# Patient Record
Sex: MALE | Race: WHITE | NOT HISPANIC OR LATINO | Employment: FULL TIME | URBAN - METROPOLITAN AREA
[De-identification: names, ages, dates, MRNs, and addresses within clinical notes are randomized per-mention and may not be internally consistent; named-entity substitution may affect disease eponyms.]

---

## 2017-01-31 ENCOUNTER — ALLSCRIPTS OFFICE VISIT (OUTPATIENT)
Dept: OTHER | Facility: OTHER | Age: 47
End: 2017-01-31

## 2017-02-27 ENCOUNTER — GENERIC CONVERSION - ENCOUNTER (OUTPATIENT)
Dept: OTHER | Facility: OTHER | Age: 47
End: 2017-02-27

## 2017-09-05 ENCOUNTER — GENERIC CONVERSION - ENCOUNTER (OUTPATIENT)
Dept: OTHER | Facility: OTHER | Age: 47
End: 2017-09-05

## 2018-01-13 VITALS
SYSTOLIC BLOOD PRESSURE: 130 MMHG | HEIGHT: 74 IN | BODY MASS INDEX: 25.81 KG/M2 | WEIGHT: 201.13 LBS | TEMPERATURE: 99 F | DIASTOLIC BLOOD PRESSURE: 90 MMHG

## 2018-07-24 ENCOUNTER — PROCEDURE VISIT (OUTPATIENT)
Dept: FAMILY MEDICINE CLINIC | Facility: CLINIC | Age: 48
End: 2018-07-24
Payer: COMMERCIAL

## 2018-07-24 DIAGNOSIS — B07.8 COMMON WART: Primary | ICD-10-CM

## 2018-07-24 PROCEDURE — 17110 DESTRUCTION B9 LES UP TO 14: CPT | Performed by: NURSE PRACTITIONER

## 2018-07-24 NOTE — PROGRESS NOTES
Lesion Destruction  Date/Time: 7/24/2018 9:52 AM  Performed by: Debbie Meza  Authorized by: Debbie Meza     Procedure Details - Lesion Destruction:     Number of Lesions:  1  Lesion 1:     Body area:  Lower extremity    Lower extremity location:  L upper leg    Initial size (mm):  0 6    Malignancy: benign lesion      Destruction method: cryotherapy    Lesion 6:      Wart was frozen for optimal lesion destructions  Patient instructed if lesion did not fall off in a week could start using topical compound W      patient states he has had problems in the past when he was little with warts and have always done fine  This encounter was previously booked as POVS  Appointment changed to PROCEDURE visit

## 2018-10-18 PROBLEM — Z83.71 FAMILY HX COLONIC POLYPS: Status: ACTIVE | Noted: 2018-10-18

## 2018-10-18 PROBLEM — Z83.719 FAMILY HX COLONIC POLYPS: Status: ACTIVE | Noted: 2018-10-18

## 2019-01-10 ENCOUNTER — ANESTHESIA EVENT (OUTPATIENT)
Dept: GASTROENTEROLOGY | Facility: HOSPITAL | Age: 49
End: 2019-01-10
Payer: COMMERCIAL

## 2019-01-11 ENCOUNTER — ANESTHESIA (OUTPATIENT)
Dept: GASTROENTEROLOGY | Facility: HOSPITAL | Age: 49
End: 2019-01-11
Payer: COMMERCIAL

## 2019-01-11 ENCOUNTER — HOSPITAL ENCOUNTER (OUTPATIENT)
Facility: HOSPITAL | Age: 49
Setting detail: OUTPATIENT SURGERY
Discharge: HOME | End: 2019-01-11
Attending: COLON & RECTAL SURGERY | Admitting: COLON & RECTAL SURGERY
Payer: COMMERCIAL

## 2019-01-11 VITALS
WEIGHT: 195 LBS | DIASTOLIC BLOOD PRESSURE: 69 MMHG | HEIGHT: 74 IN | HEART RATE: 64 BPM | SYSTOLIC BLOOD PRESSURE: 103 MMHG | OXYGEN SATURATION: 99 % | TEMPERATURE: 97.3 F | BODY MASS INDEX: 25.03 KG/M2 | RESPIRATION RATE: 18 BRPM

## 2019-01-11 DIAGNOSIS — Z83.71 FAMILY HX COLONIC POLYPS: ICD-10-CM

## 2019-01-11 PROCEDURE — 45380 COLONOSCOPY AND BIOPSY: CPT | Performed by: COLON & RECTAL SURGERY

## 2019-01-11 PROCEDURE — 45385 COLONOSCOPY W/LESION REMOVAL: CPT | Performed by: COLON & RECTAL SURGERY

## 2019-01-11 PROCEDURE — 99213 OFFICE O/P EST LOW 20 MIN: CPT | Performed by: COLON & RECTAL SURGERY

## 2019-01-11 PROCEDURE — 88305 TISSUE EXAM BY PATHOLOGIST: CPT | Performed by: PATHOLOGY

## 2019-01-11 RX ORDER — GLYCOPYRROLATE 0.2 MG/ML
INJECTION INTRAMUSCULAR; INTRAVENOUS AS NEEDED
Status: DISCONTINUED | OUTPATIENT
Start: 2019-01-11 | End: 2019-01-11 | Stop reason: SURG

## 2019-01-11 RX ORDER — PROPOFOL 10 MG/ML
INJECTION, EMULSION INTRAVENOUS AS NEEDED
Status: DISCONTINUED | OUTPATIENT
Start: 2019-01-11 | End: 2019-01-11 | Stop reason: SURG

## 2019-01-11 RX ORDER — SODIUM CHLORIDE 9 MG/ML
INJECTION, SOLUTION INTRAVENOUS CONTINUOUS PRN
Status: DISCONTINUED | OUTPATIENT
Start: 2019-01-11 | End: 2019-01-11 | Stop reason: SURG

## 2019-01-11 RX ADMIN — GLYCOPYRROLATE 0.1 MG: 0.2 INJECTION, SOLUTION INTRAMUSCULAR; INTRAVENOUS at 08:45

## 2019-01-11 RX ADMIN — PROPOFOL 200 MG: 10 INJECTION, EMULSION INTRAVENOUS at 08:45

## 2019-01-11 RX ADMIN — PROPOFOL 30 MG: 10 INJECTION, EMULSION INTRAVENOUS at 08:53

## 2019-01-11 RX ADMIN — PROPOFOL 40 MG: 10 INJECTION, EMULSION INTRAVENOUS at 09:02

## 2019-01-11 RX ADMIN — SODIUM CHLORIDE: 0.9 INJECTION, SOLUTION INTRAVENOUS at 08:40

## 2019-01-11 RX ADMIN — PROPOFOL 20 MG: 10 INJECTION, EMULSION INTRAVENOUS at 08:56

## 2019-01-11 RX ADMIN — PROPOFOL 30 MG: 10 INJECTION, EMULSION INTRAVENOUS at 08:50

## 2019-01-11 RX ADMIN — PROPOFOL 30 MG: 10 INJECTION, EMULSION INTRAVENOUS at 08:48

## 2019-01-11 RX ADMIN — PROPOFOL 30 MG: 10 INJECTION, EMULSION INTRAVENOUS at 09:08

## 2019-01-11 RX ADMIN — LIDOCAINE HYDROCHLORIDE 50 MG: 20 INJECTION, SOLUTION INTRAVENOUS at 08:45

## 2019-01-11 RX ADMIN — PROPOFOL 30 MG: 10 INJECTION, EMULSION INTRAVENOUS at 09:05

## 2019-01-11 RX ADMIN — PROPOFOL 30 MG: 10 INJECTION, EMULSION INTRAVENOUS at 09:11

## 2019-01-11 RX ADMIN — PROPOFOL 20 MG: 10 INJECTION, EMULSION INTRAVENOUS at 08:59

## 2019-01-11 RX ADMIN — LIDOCAINE HYDROCHLORIDE 50 MG: 20 INJECTION, SOLUTION INTRAVENOUS at 08:46

## 2019-01-11 NOTE — ANESTHESIA POSTPROCEDURE EVALUATION
Post-Op Assessment Note      CV Status:  Stable    Mental Status:  Alert and awake    Hydration Status:  Euvolemic    PONV Controlled:  Controlled    Airway Patency:  Patent    Post Op Vitals Reviewed: Yes          Staff: CRNA           BP   107/94   Temp     Pulse  71   Resp   18   SpO2   99

## 2019-01-11 NOTE — ANESTHESIA PREPROCEDURE EVALUATION
Review of Systems/Medical History  Patient summary reviewed  Chart reviewed  No history of anesthetic complications     Cardiovascular  Exercise tolerance (METS): >4,  No Hyperlipidemia, No hypertension , No CAD , No cardiac stents     Pulmonary  Smoker cigarette smoker  , No asthma , No recent URI ,        GI/Hepatic            Endo/Other     GYN       Hematology   Musculoskeletal       Neurology   Psychology           Physical Exam    Airway    Mallampati score: II  TM Distance: >3 FB       Dental       Cardiovascular      Pulmonary      Other Findings        Anesthesia Plan  ASA Score- 2     Anesthesia Type- IV sedation with anesthesia with ASA Monitors  Additional Monitors:   Airway Plan:     Comment: MOSHE Coleman , have personally seen and evaluated the patient prior to anesthetic care  I have reviewed the pre-anesthetic record, and other medical records if appropriate to the anesthetic care  If a CRNA is involved in the case, I have reviewed the CRNA assessment, if present, and agree  Risks/benefits and alternatives discussed with patient including possible PONV, sore throat, and possibility of rare anesthetic and surgical emergencies        Plan Factors- Patient instructed to abstain from smoking on day of procedure  Patient did not smoke on day of surgery  Induction-     Postoperative Plan-     Informed Consent- Anesthetic plan and risks discussed with patient  I personally reviewed this patient with the CRNA  Discussed and agreed on the Anesthesia Plan with the CRNA  Dara Soulier

## 2019-01-11 NOTE — H&P
History and Physical   Colon and Rectal Surgery   Franklin Tan 50 y o  male MRN: [id number]  Unit/Bed#: [facility] Encounter: [id number]  01/11/19   8:42 AM      No chief complaint on file  History of Present Illness   HPI:  Franklin Tan is a 50 y o  male who presents with Ulcerative proctitis, mother with polyps, last colonoscopy 12/2015  He has had some recent bleeding that he tried prolapse of for that improve this  He was on Lialda in the past but has not been on this for some time now  Denies any weight loss or any other abdominal complaints  Denies nausea/vomiting/abdominal pain, change in bowel habits, rectal bleeding, or other constitutional symptoms  Historical Information   Past Medical History:   Diagnosis Date    Colitis     Rectal bleed      Past Surgical History:   Procedure Laterality Date    COLONOSCOPY         Meds/Allergies     Prescriptions Prior to Admission   Medication    mesalamine (ROWASA) 4 g       No current facility-administered medications for this encounter       No Known Allergies      Social History   History   Alcohol Use    Yes     Comment: weekends     History   Drug Use No     History   Smoking Status    Current Some Day Smoker   Smokeless Tobacco    Never Used     Comment: cigars         Family History:   Family History   Problem Relation Age of Onset    Heart disease Mother     No Known Problems Father          Objective     Current Vitals:   Blood Pressure: 119/80 (01/11/19 0705)  Pulse: 71 (01/11/19 0705)  Temperature: (!) 97 3 °F (36 3 °C) (01/11/19 0705)  Temp Source: Tympanic (01/11/19 0705)  Respirations: 20 (01/11/19 0705)  Height: 6' 2" (188 cm) (01/11/19 0705)  Weight - Scale: 88 5 kg (195 lb) (01/11/19 0705)  SpO2: 99 % (01/11/19 0705)  No intake or output data in the 24 hours ending 01/11/19 0842    Physical Exam:  General:no distress  Eyes:perrla/eomi  ENT:moist mucus membranes  Neck:supple  Pulm:no increased work of breathing  CV:sinus  Abdomen:soft,nontender  Extremities:no edema  Lymphatics:no neck/axillary/groin lymphadenopathy        ASSESSMENT:   Ulcerative proctitis, mother with polyps, last colonoscopy 12/2015  Colonoscopy,discussed in a face-to-face, personal, informed consent process, the benefits, alternatives, risks including not limited to bleeding, missed lesion, perforation requiring emergent surgery discussed/understood        PLAN:  Colonoscopy

## 2019-01-11 NOTE — OP NOTE
OPERATIVE REPORT  PATIENT NAME: Alice Truong    :  1970  MRN: [id number]  Pt Location: [facility]    SURGERY DATE: 2019    Surgeon(s) and Role:     * Junior Enid MD - Primary    Operative Findings:  -Ascending polyp 6-8mm, sessile behind fold distal to IC valve, removed cold snare polypectomy  -Otherwise normal terminal ileum intubation, normal ascending transverse descending sigmoid rectum and retroflexion, no signs of ongoing proctitis or colitis, labeled cold biopsies taken      Recommendations:  -High fiber diet/increased hydration, 20-30grams fiber per day, increased fruits/vegetables/psyllium(metamucil or konsyl 1 tbsp 1-2x/day)  -3 year recall colonoscopy for polyp and family history    Preop Diagnosis:  Family hx colonic polyps [Z83 71]  Ulcerative proctitis history    Post-Op Diagnosis Codes:     * Family hx colonic polyps [Z83 71]    Procedure(s) (LRB):  COLONOSCOPY (N/A) w/snare polypectomy, cold biopsy    Specimen(s):  ID Type Source Tests Collected by Time Destination   1 : random BX Hx Proctitis Tissue Colon TISSUE EXAM Junior Enid MD 2019 3423    2 : random ascending BX Hx Proctitis Tissue Colon TISSUE EXAM Junior Enid MD 2019 2308    3 : ascendingcolon, cold snare Tissue Polyp, Colorectal TISSUE EXAM Junior Enid MD 2019 0856    4 : random transverse BX Hx Proctitis Tissue Colon TISSUE EXAM Junior Enid MD 2019 0906    5 : random descending BX Hx Proctitis Tissue Colon TISSUE EXAM Junior Enid MD 2019 0909    6 : random sigmoid BX Hx Proctitis Tissue Colon TISSUE EXAM Junior Enid MD 2019 0912    7 : random rectal BX Hx Proctitis Tissue Colon TISSUE EXAM Junior Enid MD 2019 0913        Estimated Blood Loss:   Minimal    Anesthesia Type:   IV Sedation with Anesthesia    Operative Indications:  Family hx colonic polyps [M29 71]    Complications:   None    Procedure and Technique:  After appropriate identification, patient was placed in left lateral decubitus position, timeout was taken per protocol and after mac sedation was induced digital rectal examination revealed normal rectal examination  Colonoscope was introduced and advanced without difficulty to cecum identified by appendiceal orifice and ileocecal valve, photo taken  Scope was then removed with careful mucosal evaluation and visualization, retroflexion rectum  Prep was adequate with moderate amounts of suctionable fluid  Cecal intubation time 2 minutes withdrawal time 25 minutes       I was present for the entire procedure    Patient Disposition:  PACU     SIGNATURE: Angela Oropeza MD  DATE: January 11, 2019  TIME: 9:18 AM

## 2019-01-11 NOTE — DISCHARGE INSTRUCTIONS
OPERATIVE REPORT  PATIENT NAME: Heydi Angeles    :  1970  MRN: [id number]  Pt Location: [facility]    SURGERY DATE: 2019    Surgeon(s) and Role:     * Bijal Mckeon MD - Primary    Operative Findings:  -Ascending polyp 6-8mm, sessile behind fold distal to IC valve, removed cold snare polypectomy  -Otherwise normal terminal ileum intubation, normal ascending transverse descending sigmoid rectum and retroflexion, no signs of ongoing proctitis or colitis, labeled cold biopsies taken      Recommendations:  -High fiber diet/increased hydration, 20-30grams fiber per day, increased fruits/vegetables/psyllium(metamucil or konsyl 1 tbsp 1-2x/day)  -3 year recall colonoscopy for polyp and family history    Preop Diagnosis:  Family hx colonic polyps [Z83 71]  Ulcerative proctitis history    Post-Op Diagnosis Codes:     * Family hx colonic polyps [Z83 71]    Procedure(s) (LRB):  COLONOSCOPY (N/A) w/snare polypectomy, cold biopsy    Specimen(s):  ID Type Source Tests Collected by Time Destination   1 : random BX Hx Proctitis Tissue Colon TISSUE EXAM Bijal Mckeon MD 2019 6218    2 : random ascending BX Hx Proctitis Tissue Colon TISSUE EXAM Bijal Mckeon MD 2019 1396    3 : ascendingcolon, cold snare Tissue Polyp, Colorectal TISSUE EXAM Bijal Mckeon MD 2019 0856    4 : random transverse BX Hx Proctitis Tissue Colon TISSUE EXAM Bijal Mckeon MD 2019 0906    5 : random descending BX Hx Proctitis Tissue Colon TISSUE EXAM Bijal Mckeon MD 2019 0909    6 : random sigmoid BX Hx Proctitis Tissue Colon TISSUE EXAM Bijal Mckeon MD 2019 0912    7 : random rectal BX Hx Proctitis Tissue Colon TISSUE EXAM Bijal Mckeon MD 2019 0913        Estimated Blood Loss:   Minimal    Anesthesia Type:   IV Sedation with Anesthesia    Operative Indications:  Family hx colonic polyps [F17 29]    Complications:   None    Procedure and Technique:  After appropriate identification, patient was placed in left lateral decubitus position, timeout was taken per protocol and after mac sedation was induced digital rectal examination revealed normal rectal examination  Colonoscope was introduced and advanced without difficulty to cecum identified by appendiceal orifice and ileocecal valve, photo taken  Scope was then removed with careful mucosal evaluation and visualization, retroflexion rectum  Prep was adequate with moderate amounts of suctionable fluid  Cecal intubation time 2 minutes withdrawal time 25 minutes       I was present for the entire procedure    Patient Disposition:  PACU     SIGNATURE: Sebastian Aguiar MD  DATE: January 11, 2019  TIME: 9:18 AM

## 2020-01-21 PROBLEM — K62.89 PROCTITIS: Status: ACTIVE | Noted: 2020-01-21

## 2020-07-29 ENCOUNTER — OFFICE VISIT (OUTPATIENT)
Dept: FAMILY MEDICINE CLINIC | Facility: CLINIC | Age: 50
End: 2020-07-29
Payer: COMMERCIAL

## 2020-07-29 VITALS
DIASTOLIC BLOOD PRESSURE: 70 MMHG | SYSTOLIC BLOOD PRESSURE: 112 MMHG | BODY MASS INDEX: 24.9 KG/M2 | WEIGHT: 194 LBS | HEART RATE: 80 BPM | TEMPERATURE: 97.8 F | HEIGHT: 74 IN

## 2020-07-29 DIAGNOSIS — M79.671 PAIN OF RIGHT HEEL: Primary | ICD-10-CM

## 2020-07-29 PROCEDURE — 3008F BODY MASS INDEX DOCD: CPT | Performed by: NURSE PRACTITIONER

## 2020-07-29 PROCEDURE — 99213 OFFICE O/P EST LOW 20 MIN: CPT | Performed by: NURSE PRACTITIONER

## 2020-07-29 NOTE — PATIENT INSTRUCTIONS
Heel Spur   WHAT YOU NEED TO KNOW:   A heel spur develops when calcium builds up on the underside of your heel bone  Heel spurs can be caused by inflammation or strains on your foot muscles and ligaments  Heel spurs are often painless unless the tissue around your heel swells  This pain is often worse when you walk, jog, or run  DISCHARGE INSTRUCTIONS:   Contact your healthcare provider if:   · Your pain gets worse  · You have questions or concerns about your condition or care  Medicines: Your healthcare provider may  suggest any of the following:  · NSAIDs , such as ibuprofen, help decrease swelling, pain, and fever  This medicine is available with or without a doctor's order  NSAIDs can cause stomach bleeding or kidney problems in certain people  If you take blood thinner medicine, always ask your healthcare provider if NSAIDs are safe for you  Always read the medicine label and follow directions  · Acetaminophen  decreases pain and fever  It is available without a doctor's order  Ask how much to take and how often to take it  Follow directions  Acetaminophen can cause liver damage if not taken correctly  · Take your medicine as directed  Contact your healthcare provider if you think your medicine is not helping or if you have side effects  Tell him of her if you are allergic to any medicine  Keep a list of the medicines, vitamins, and herbs you take  Include the amounts, and when and why you take them  Bring the list or the pill bottles to follow-up visits  Carry your medicine list with you in case of an emergency  Self-care:   · Rest your injured foot so that it can heal   You may need to avoid putting any weight on your leg for at least 48 hours  Return to normal activities as directed  · Ice the injury for 20 minutes every 4 hours, or as directed  Use an ice pack, or put crushed ice in a plastic bag  Cover it with a towel to protect your skin   Ice helps prevent tissue damage and decreases swelling and pain  · Stretch before you get out of bed and as directed  This loosens your muscles and tendons in your legs and feet  Ask your healthcare provider which stretches you should do and how often to do them  · Your healthcare provider may give you a shoe insert  such as a pad or heel cup  He may tell you to tape your foot  Ask your healthcare provider to show you how to tape your feet properly  Decrease stress on your muscles and tendons in your feet by taping your foot or wearing the insert  · Wear properly fitting shoes  Shoes with good arch and heel support decrease the pressure on your heels  · Maintain a healthy weight  Pressure on your heels increases with increased weight  Ask your healthcare provider how much you should weigh  Ask him to help you create a weight loss program if you are overweight  Follow up with your healthcare provider as directed: You may need special inserts for your shoes  You may need more tests or treatments  Your healthcare provider may suggest physical therapy  Write down your questions so you remember to ask them during your visits  © 2017 [facility] Information is for End User's use only and may not be sold, redistributed or otherwise used for commercial purposes  All illustrations and images included in CareNotes® are the copyrighted property of A D A M , Inc  or Jus Mcgarry  The above information is an  only  It is not intended as medical advice for individual conditions or treatments  Talk to your doctor, nurse or pharmacist before following any medical regimen to see if it is safe and effective for you

## 2020-07-29 NOTE — PROGRESS NOTES
Assessment and Plan:    Problem List Items Addressed This Visit        Other    Pain of right heel - Primary     Refused meloxicam  Advised can take ibuprofen 60mg if pain worsens  Refused podiatry consult for now  Relevant Orders    XR heel / calcaneus 2+ vw right                 Diagnoses and all orders for this visit:    Pain of right heel  -     XR heel / calcaneus 2+ vw right; Future              Subjective:      Patient ID: Ramin Castro is a 48 y o  male  CC:    Chief Complaint   Patient presents with    Heel Pain     c/o right heel pain since March  s       HPI:    Patient reports that since the beginning of March she has had right heel pain  He states that this past Monday it was really bad  However reports that today it is doing well  He has not done her taken anything for this  The following portions of the patient's history were reviewed and updated as appropriate: allergies, current medications, past family history, past medical history, past social history, past surgical history and problem list       Review of Systems   Constitutional: Negative  Respiratory: Negative  Cardiovascular: Negative  Musculoskeletal: Positive for arthralgias  As noted in the HPI          Data to review:       Objective:    Vitals:    07/29/20 1843   BP: 112/70   Pulse: 80   Temp: 97 8 °F (36 6 °C)   Weight: 88 kg (194 lb)   Height: 6' 2" (1 88 m)        Physical Exam   Constitutional: He is oriented to person, place, and time  Vital signs are normal  He appears well-developed and well-nourished  He does not appear ill  HENT:   Head: Normocephalic and atraumatic  Eyes: Pupils are equal    Cardiovascular: Normal rate, regular rhythm, S1 normal and S2 normal    No murmur heard  Pulmonary/Chest: Effort normal and breath sounds normal  No respiratory distress  Musculoskeletal:        Right foot: There is tenderness (at heel )   There is normal range of motion, no bony tenderness, no swelling and no deformity  Neurological: He is alert and oriented to person, place, and time  Psychiatric: He has a normal mood and affect   Thought content normal

## 2020-07-29 NOTE — ASSESSMENT & PLAN NOTE
Refused meloxicam  Advised can take ibuprofen 60mg if pain worsens  Refused podiatry consult for now

## 2020-08-25 ENCOUNTER — OFFICE VISIT (OUTPATIENT)
Dept: FAMILY MEDICINE CLINIC | Facility: CLINIC | Age: 50
End: 2020-08-25
Payer: COMMERCIAL

## 2020-08-25 ENCOUNTER — APPOINTMENT (OUTPATIENT)
Dept: LAB | Facility: CLINIC | Age: 50
End: 2020-08-25
Payer: COMMERCIAL

## 2020-08-25 VITALS
HEIGHT: 74 IN | BODY MASS INDEX: 24.85 KG/M2 | TEMPERATURE: 98.4 F | DIASTOLIC BLOOD PRESSURE: 74 MMHG | WEIGHT: 193.6 LBS | SYSTOLIC BLOOD PRESSURE: 116 MMHG | HEART RATE: 70 BPM | RESPIRATION RATE: 18 BRPM

## 2020-08-25 DIAGNOSIS — Z00.00 ANNUAL PHYSICAL EXAM: Primary | ICD-10-CM

## 2020-08-25 DIAGNOSIS — F17.290 CIGAR SMOKER: ICD-10-CM

## 2020-08-25 DIAGNOSIS — Z00.00 ANNUAL PHYSICAL EXAM: ICD-10-CM

## 2020-08-25 DIAGNOSIS — Z12.5 SCREENING FOR PROSTATE CANCER: ICD-10-CM

## 2020-08-25 LAB
ALBUMIN SERPL BCP-MCNC: 4.2 G/DL (ref 3.5–5)
ALP SERPL-CCNC: 61 U/L (ref 46–116)
ALT SERPL W P-5'-P-CCNC: 28 U/L (ref 12–78)
ANION GAP SERPL CALCULATED.3IONS-SCNC: 4 MMOL/L (ref 4–13)
AST SERPL W P-5'-P-CCNC: 12 U/L (ref 5–45)
BASOPHILS # BLD AUTO: 0.01 THOUSANDS/ΜL (ref 0–0.1)
BASOPHILS NFR BLD AUTO: 0 % (ref 0–1)
BILIRUB SERPL-MCNC: 0.79 MG/DL (ref 0.2–1)
BUN SERPL-MCNC: 11 MG/DL (ref 5–25)
CALCIUM SERPL-MCNC: 9.4 MG/DL (ref 8.3–10.1)
CHLORIDE SERPL-SCNC: 108 MMOL/L (ref 100–108)
CHOLEST SERPL-MCNC: 194 MG/DL (ref 50–200)
CO2 SERPL-SCNC: 28 MMOL/L (ref 21–32)
CREAT SERPL-MCNC: 0.96 MG/DL (ref 0.6–1.3)
EOSINOPHIL # BLD AUTO: 0.03 THOUSAND/ΜL (ref 0–0.61)
EOSINOPHIL NFR BLD AUTO: 1 % (ref 0–6)
ERYTHROCYTE [DISTWIDTH] IN BLOOD BY AUTOMATED COUNT: 13.6 % (ref 11.6–15.1)
GFR SERPL CREATININE-BSD FRML MDRD: 92 ML/MIN/1.73SQ M
GLUCOSE P FAST SERPL-MCNC: 88 MG/DL (ref 65–99)
HCT VFR BLD AUTO: 47.8 % (ref 36.5–49.3)
HDLC SERPL-MCNC: 55 MG/DL
HGB BLD-MCNC: 15.2 G/DL (ref 12–17)
IMM GRANULOCYTES # BLD AUTO: 0.01 THOUSAND/UL (ref 0–0.2)
IMM GRANULOCYTES NFR BLD AUTO: 0 % (ref 0–2)
LDLC SERPL CALC-MCNC: 119 MG/DL (ref 0–100)
LYMPHOCYTES # BLD AUTO: 1.51 THOUSANDS/ΜL (ref 0.6–4.47)
LYMPHOCYTES NFR BLD AUTO: 33 % (ref 14–44)
MCH RBC QN AUTO: 30.3 PG (ref 26.8–34.3)
MCHC RBC AUTO-ENTMCNC: 31.8 G/DL (ref 31.4–37.4)
MCV RBC AUTO: 95 FL (ref 82–98)
MONOCYTES # BLD AUTO: 0.33 THOUSAND/ΜL (ref 0.17–1.22)
MONOCYTES NFR BLD AUTO: 7 % (ref 4–12)
NEUTROPHILS # BLD AUTO: 2.66 THOUSANDS/ΜL (ref 1.85–7.62)
NEUTS SEG NFR BLD AUTO: 59 % (ref 43–75)
NONHDLC SERPL-MCNC: 139 MG/DL
NRBC BLD AUTO-RTO: 0 /100 WBCS
PLATELET # BLD AUTO: 201 THOUSANDS/UL (ref 149–390)
PMV BLD AUTO: 11.4 FL (ref 8.9–12.7)
POTASSIUM SERPL-SCNC: 4.7 MMOL/L (ref 3.5–5.3)
PROT SERPL-MCNC: 7.8 G/DL (ref 6.4–8.2)
PSA SERPL-MCNC: 0.6 NG/ML (ref 0–4)
RBC # BLD AUTO: 5.01 MILLION/UL (ref 3.88–5.62)
SODIUM SERPL-SCNC: 140 MMOL/L (ref 136–145)
TRIGL SERPL-MCNC: 100 MG/DL
TSH SERPL DL<=0.05 MIU/L-ACNC: 1.61 UIU/ML (ref 0.36–3.74)
WBC # BLD AUTO: 4.55 THOUSAND/UL (ref 4.31–10.16)

## 2020-08-25 PROCEDURE — 4004F PT TOBACCO SCREEN RCVD TLK: CPT | Performed by: NURSE PRACTITIONER

## 2020-08-25 PROCEDURE — 80061 LIPID PANEL: CPT | Performed by: NURSE PRACTITIONER

## 2020-08-25 PROCEDURE — 36415 COLL VENOUS BLD VENIPUNCTURE: CPT

## 2020-08-25 PROCEDURE — G0103 PSA SCREENING: HCPCS

## 2020-08-25 PROCEDURE — 80053 COMPREHEN METABOLIC PANEL: CPT

## 2020-08-25 PROCEDURE — 84443 ASSAY THYROID STIM HORMONE: CPT

## 2020-08-25 PROCEDURE — 90471 IMMUNIZATION ADMIN: CPT

## 2020-08-25 PROCEDURE — 90715 TDAP VACCINE 7 YRS/> IM: CPT

## 2020-08-25 PROCEDURE — 3008F BODY MASS INDEX DOCD: CPT | Performed by: NURSE PRACTITIONER

## 2020-08-25 PROCEDURE — 99396 PREV VISIT EST AGE 40-64: CPT | Performed by: NURSE PRACTITIONER

## 2020-08-25 PROCEDURE — 85025 COMPLETE CBC W/AUTO DIFF WBC: CPT
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
## 2020-08-25 NOTE — PATIENT INSTRUCTIONS

## 2022-01-17 PROBLEM — M79.671 PAIN OF RIGHT HEEL: Status: RESOLVED | Noted: 2020-07-29 | Resolved: 2022-01-17

## 2022-01-18 ENCOUNTER — OFFICE VISIT (OUTPATIENT)
Dept: FAMILY MEDICINE CLINIC | Facility: CLINIC | Age: 52
End: 2022-01-18
Payer: COMMERCIAL

## 2022-01-18 ENCOUNTER — APPOINTMENT (OUTPATIENT)
Dept: LAB | Facility: CLINIC | Age: 52
End: 2022-01-18
Payer: COMMERCIAL

## 2022-01-18 VITALS
WEIGHT: 204.38 LBS | SYSTOLIC BLOOD PRESSURE: 138 MMHG | DIASTOLIC BLOOD PRESSURE: 82 MMHG | TEMPERATURE: 97.2 F | BODY MASS INDEX: 26.23 KG/M2 | HEIGHT: 74 IN

## 2022-01-18 DIAGNOSIS — Z00.00 HEALTH CARE MAINTENANCE: ICD-10-CM

## 2022-01-18 DIAGNOSIS — K62.89 PROCTITIS: ICD-10-CM

## 2022-01-18 DIAGNOSIS — G57.52 TARSAL TUNNEL SYNDROME OF LEFT SIDE: ICD-10-CM

## 2022-01-18 DIAGNOSIS — R20.2 PARESTHESIA OF LEFT FOOT: Primary | ICD-10-CM

## 2022-01-18 DIAGNOSIS — F17.290 CIGAR SMOKER: ICD-10-CM

## 2022-01-18 DIAGNOSIS — Z11.4 SCREENING FOR HIV (HUMAN IMMUNODEFICIENCY VIRUS): ICD-10-CM

## 2022-01-18 DIAGNOSIS — Z12.5 SCREENING FOR PROSTATE CANCER: ICD-10-CM

## 2022-01-18 DIAGNOSIS — Z11.59 NEED FOR HEPATITIS C SCREENING TEST: ICD-10-CM

## 2022-01-18 DIAGNOSIS — E78.5 DYSLIPIDEMIA: ICD-10-CM

## 2022-01-18 DIAGNOSIS — E66.3 OVERWEIGHT (BMI 25.0-29.9): ICD-10-CM

## 2022-01-18 DIAGNOSIS — Z83.71 FAMILY HX COLONIC POLYPS: ICD-10-CM

## 2022-01-18 DIAGNOSIS — R20.2 PARESTHESIA OF LEFT FOOT: ICD-10-CM

## 2022-01-18 LAB
ALBUMIN SERPL BCP-MCNC: 4.2 G/DL (ref 3.5–5)
ALP SERPL-CCNC: 62 U/L (ref 46–116)
ALT SERPL W P-5'-P-CCNC: 45 U/L (ref 12–78)
ANION GAP SERPL CALCULATED.3IONS-SCNC: 5 MMOL/L (ref 4–13)
AST SERPL W P-5'-P-CCNC: 17 U/L (ref 5–45)
BILIRUB SERPL-MCNC: 0.59 MG/DL (ref 0.2–1)
BILIRUB UR QL STRIP: NEGATIVE
BUN SERPL-MCNC: 15 MG/DL (ref 5–25)
CALCIUM SERPL-MCNC: 9.1 MG/DL (ref 8.3–10.1)
CHLORIDE SERPL-SCNC: 108 MMOL/L (ref 100–108)
CHOLEST SERPL-MCNC: 207 MG/DL
CLARITY UR: CLEAR
CO2 SERPL-SCNC: 27 MMOL/L (ref 21–32)
COLOR UR: NORMAL
CREAT SERPL-MCNC: 0.94 MG/DL (ref 0.6–1.3)
ERYTHROCYTE [DISTWIDTH] IN BLOOD BY AUTOMATED COUNT: 13.2 % (ref 11.6–15.1)
FOLATE SERPL-MCNC: 19.6 NG/ML (ref 3.1–17.5)
GFR SERPL CREATININE-BSD FRML MDRD: 92 ML/MIN/1.73SQ M
GLUCOSE SERPL-MCNC: 75 MG/DL (ref 65–140)
GLUCOSE UR STRIP-MCNC: NEGATIVE MG/DL
HCT VFR BLD AUTO: 44.7 % (ref 36.5–49.3)
HCV AB SER QL: NORMAL
HDLC SERPL-MCNC: 49 MG/DL
HGB BLD-MCNC: 14.8 G/DL (ref 12–17)
HGB UR QL STRIP.AUTO: NEGATIVE
KETONES UR STRIP-MCNC: NEGATIVE MG/DL
LDLC SERPL CALC-MCNC: 140 MG/DL (ref 0–100)
LEUKOCYTE ESTERASE UR QL STRIP: NEGATIVE
MCH RBC QN AUTO: 30.1 PG (ref 26.8–34.3)
MCHC RBC AUTO-ENTMCNC: 33.1 G/DL (ref 31.4–37.4)
MCV RBC AUTO: 91 FL (ref 82–98)
NITRITE UR QL STRIP: NEGATIVE
PH UR STRIP.AUTO: 7 [PH]
PLATELET # BLD AUTO: 230 THOUSANDS/UL (ref 149–390)
PMV BLD AUTO: 10.6 FL (ref 8.9–12.7)
POTASSIUM SERPL-SCNC: 4.2 MMOL/L (ref 3.5–5.3)
PROT SERPL-MCNC: 7.8 G/DL (ref 6.4–8.2)
PROT UR STRIP-MCNC: NEGATIVE MG/DL
RBC # BLD AUTO: 4.92 MILLION/UL (ref 3.88–5.62)
SODIUM SERPL-SCNC: 140 MMOL/L (ref 136–145)
SP GR UR STRIP.AUTO: 1.01 (ref 1–1.03)
TRIGL SERPL-MCNC: 89 MG/DL
TSH SERPL DL<=0.05 MIU/L-ACNC: 1.15 UIU/ML (ref 0.36–3.74)
UROBILINOGEN UR QL STRIP.AUTO: 0.2 E.U./DL
VIT B12 SERPL-MCNC: 557 PG/ML (ref 100–900)
WBC # BLD AUTO: 6.05 THOUSAND/UL (ref 4.31–10.16)

## 2022-01-18 PROCEDURE — 36415 COLL VENOUS BLD VENIPUNCTURE: CPT

## 2022-01-18 PROCEDURE — 3008F BODY MASS INDEX DOCD: CPT | Performed by: FAMILY MEDICINE

## 2022-01-18 PROCEDURE — 4004F PT TOBACCO SCREEN RCVD TLK: CPT | Performed by: FAMILY MEDICINE

## 2022-01-18 PROCEDURE — 80061 LIPID PANEL: CPT

## 2022-01-18 PROCEDURE — 84443 ASSAY THYROID STIM HORMONE: CPT

## 2022-01-18 PROCEDURE — G0103 PSA SCREENING: HCPCS

## 2022-01-18 PROCEDURE — 99214 OFFICE O/P EST MOD 30 MIN: CPT | Performed by: FAMILY MEDICINE

## 2022-01-18 PROCEDURE — 85027 COMPLETE CBC AUTOMATED: CPT

## 2022-01-18 PROCEDURE — 81003 URINALYSIS AUTO W/O SCOPE: CPT

## 2022-01-18 PROCEDURE — 3725F SCREEN DEPRESSION PERFORMED: CPT | Performed by: FAMILY MEDICINE

## 2022-01-18 PROCEDURE — 86803 HEPATITIS C AB TEST: CPT

## 2022-01-18 PROCEDURE — 86618 LYME DISEASE ANTIBODY: CPT

## 2022-01-18 PROCEDURE — 87389 HIV-1 AG W/HIV-1&-2 AB AG IA: CPT

## 2022-01-18 PROCEDURE — 82746 ASSAY OF FOLIC ACID SERUM: CPT

## 2022-01-18 PROCEDURE — 80053 COMPREHEN METABOLIC PANEL: CPT

## 2022-01-18 PROCEDURE — 82607 VITAMIN B-12: CPT

## 2022-01-18 NOTE — PATIENT INSTRUCTIONS
Complete blood work today, may be nonfasting  Patient gained 10 lb, diet exercise weight loss recommended  Follow with colorectal specialist per family history colon polyps and proctitis  Return after podiatry evaluation if needed  Return in 1 year for routine exam

## 2022-01-18 NOTE — PROGRESS NOTES
Assessment and Plan:    1  Left foot/great toe paresthesias  2  Tarsal tunnel, left side  Blood work ordered  Refer to Dr Pau King  3  Dyslipidemia blood work ordered  4  Proctitis/family history colon polyp patient follows with colorectal specialist  5  Cigar smoker, recommend complete cessation  6  BMI 26 24 patient gained 10 lb diet exercise weight loss recommended  7  Screening prostate cancer, PSA is ordered  8  Health care maintenance, screening for hepatitis-C and HIV discussed orders placed  9   Patient to return after evaluation by Podiatry if needed otherwise return in 1 year  Problem List Items Addressed This Visit        Digestive    Proctitis    Relevant Orders    CBC    Comprehensive metabolic panel    Lipid Panel with Direct LDL reflex    TSH, 3rd generation with Free T4 reflex    UA (URINE) with reflex to Scope    PSA, Total Screen    Vitamin B12    Folate    Lyme Total Antibody Profile with reflex to WB       Other    Family hx colonic polyps    Relevant Orders    CBC    Comprehensive metabolic panel    Lipid Panel with Direct LDL reflex    TSH, 3rd generation with Free T4 reflex    UA (URINE) with reflex to Scope    PSA, Total Screen    Vitamin B12    Folate    Lyme Total Antibody Profile with reflex to WB    Cigar smoker    Relevant Orders    CBC    Comprehensive metabolic panel    Lipid Panel with Direct LDL reflex    TSH, 3rd generation with Free T4 reflex    UA (URINE) with reflex to Scope    PSA, Total Screen    Vitamin B12    Folate    Lyme Total Antibody Profile with reflex to WB    Screening for prostate cancer    Relevant Orders    CBC    Comprehensive metabolic panel    Lipid Panel with Direct LDL reflex    TSH, 3rd generation with Free T4 reflex    UA (URINE) with reflex to Scope    PSA, Total Screen    Vitamin B12    Folate    Lyme Total Antibody Profile with reflex to WB    Health care maintenance    Relevant Orders    CBC    Comprehensive metabolic panel    Lipid Panel with Direct LDL reflex    TSH, 3rd generation with Free T4 reflex    UA (URINE) with reflex to Scope    PSA, Total Screen    Vitamin B12    Folate    Lyme Total Antibody Profile with reflex to WB    Dyslipidemia    Relevant Orders    CBC    Comprehensive metabolic panel    Lipid Panel with Direct LDL reflex    TSH, 3rd generation with Free T4 reflex    UA (URINE) with reflex to Scope    PSA, Total Screen    Vitamin B12    Folate    Lyme Total Antibody Profile with reflex to WB    Overweight (BMI 25 0-29  9)    Relevant Orders    CBC    Comprehensive metabolic panel    Lipid Panel with Direct LDL reflex    TSH, 3rd generation with Free T4 reflex    UA (URINE) with reflex to Scope    PSA, Total Screen    Vitamin B12    Folate    Lyme Total Antibody Profile with reflex to WB      Other Visit Diagnoses     Paresthesia of left foot    -  Primary    Relevant Orders    CBC    Comprehensive metabolic panel    Lipid Panel with Direct LDL reflex    TSH, 3rd generation with Free T4 reflex    UA (URINE) with reflex to Scope    PSA, Total Screen    Vitamin B12    Folate    Lyme Total Antibody Profile with reflex to WB    Ambulatory Referral to Podiatry    Need for hepatitis C screening test        Relevant Orders    Hepatitis C Antibody ([facility])    CBC    Comprehensive metabolic panel    Lipid Panel with Direct LDL reflex    TSH, 3rd generation with Free T4 reflex    UA (URINE) with reflex to Scope    PSA, Total Screen    Vitamin B12    Folate    Lyme Total Antibody Profile with reflex to WB    Screening for HIV (human immunodeficiency virus)        Relevant Orders    HIV 1/2 Antigen/Antibody (4th Generation) w Reflex [facility]    CBC    Comprehensive metabolic panel    Lipid Panel with Direct LDL reflex    TSH, 3rd generation with Free T4 reflex    UA (URINE) with reflex to Scope    PSA, Total Screen    Vitamin B12    Folate    Lyme Total Antibody Profile with reflex to WB    Tarsal tunnel syndrome of left side        Relevant Orders    CBC    Comprehensive metabolic panel    Lipid Panel with Direct LDL reflex    TSH, 3rd generation with Free T4 reflex    UA (URINE) with reflex to Scope    PSA, Total Screen    Vitamin B12    Folate    Lyme Total Antibody Profile with reflex to WB    Ambulatory Referral to Podiatry                 Diagnoses and all orders for this visit:    Paresthesia of left foot  -     CBC; Future  -     Comprehensive metabolic panel; Future  -     Lipid Panel with Direct LDL reflex; Future  -     TSH, 3rd generation with Free T4 reflex; Future  -     UA (URINE) with reflex to Scope; Future  -     PSA, Total Screen; Future  -     Vitamin B12; Future  -     Folate; Future  -     Lyme Total Antibody Profile with reflex to WB; Future  -     Ambulatory Referral to Podiatry; Future    Need for hepatitis C screening test  -     Hepatitis C Antibody ([facility]); Future  -     CBC; Future  -     Comprehensive metabolic panel; Future  -     Lipid Panel with Direct LDL reflex; Future  -     TSH, 3rd generation with Free T4 reflex; Future  -     UA (URINE) with reflex to Scope; Future  -     PSA, Total Screen; Future  -     Vitamin B12; Future  -     Folate; Future  -     Lyme Total Antibody Profile with reflex to WB; Future    Screening for HIV (human immunodeficiency virus)  -     HIV 1/2 Antigen/Antibody (4th Generation) w Reflex [facility]; Future  -     CBC; Future  -     Comprehensive metabolic panel; Future  -     Lipid Panel with Direct LDL reflex; Future  -     TSH, 3rd generation with Free T4 reflex; Future  -     UA (URINE) with reflex to Scope; Future  -     PSA, Total Screen; Future  -     Vitamin B12; Future  -     Folate; Future  -     Lyme Total Antibody Profile with reflex to WB; Future    Dyslipidemia  -     CBC; Future  -     Comprehensive metabolic panel; Future  -     Lipid Panel with Direct LDL reflex; Future  -     TSH, 3rd generation with Free T4 reflex;  Future  -     UA (URINE) with reflex to Scope; Future  -     PSA, Total Screen; Future  -     Vitamin B12; Future  -     Folate; Future  -     Lyme Total Antibody Profile with reflex to WB; Future    Proctitis  -     CBC; Future  -     Comprehensive metabolic panel; Future  -     Lipid Panel with Direct LDL reflex; Future  -     TSH, 3rd generation with Free T4 reflex; Future  -     UA (URINE) with reflex to Scope; Future  -     PSA, Total Screen; Future  -     Vitamin B12; Future  -     Folate; Future  -     Lyme Total Antibody Profile with reflex to WB; Future    Cigar smoker  -     CBC; Future  -     Comprehensive metabolic panel; Future  -     Lipid Panel with Direct LDL reflex; Future  -     TSH, 3rd generation with Free T4 reflex; Future  -     UA (URINE) with reflex to Scope; Future  -     PSA, Total Screen; Future  -     Vitamin B12; Future  -     Folate; Future  -     Lyme Total Antibody Profile with reflex to WB; Future    Family hx colonic polyps  -     CBC; Future  -     Comprehensive metabolic panel; Future  -     Lipid Panel with Direct LDL reflex; Future  -     TSH, 3rd generation with Free T4 reflex; Future  -     UA (URINE) with reflex to Scope; Future  -     PSA, Total Screen; Future  -     Vitamin B12; Future  -     Folate; Future  -     Lyme Total Antibody Profile with reflex to WB; Future    Health care maintenance  -     CBC; Future  -     Comprehensive metabolic panel; Future  -     Lipid Panel with Direct LDL reflex; Future  -     TSH, 3rd generation with Free T4 reflex; Future  -     UA (URINE) with reflex to Scope; Future  -     PSA, Total Screen; Future  -     Vitamin B12; Future  -     Folate; Future  -     Lyme Total Antibody Profile with reflex to WB; Future    Overweight (BMI 25 0-29 9)  -     CBC; Future  -     Comprehensive metabolic panel; Future  -     Lipid Panel with Direct LDL reflex; Future  -     TSH, 3rd generation with Free T4 reflex; Future  -     UA (URINE) with reflex to Scope;  Future  -     PSA, Total Screen; Future  -     Vitamin B12; Future  -     Folate; Future  -     Lyme Total Antibody Profile with reflex to WB; Future    Screening for prostate cancer  -     CBC; Future  -     Comprehensive metabolic panel; Future  -     Lipid Panel with Direct LDL reflex; Future  -     TSH, 3rd generation with Free T4 reflex; Future  -     UA (URINE) with reflex to Scope; Future  -     PSA, Total Screen; Future  -     Vitamin B12; Future  -     Folate; Future  -     Lyme Total Antibody Profile with reflex to WB; Future    Tarsal tunnel syndrome of left side  -     CBC; Future  -     Comprehensive metabolic panel; Future  -     Lipid Panel with Direct LDL reflex; Future  -     TSH, 3rd generation with Free T4 reflex; Future  -     UA (URINE) with reflex to Scope; Future  -     PSA, Total Screen; Future  -     Vitamin B12; Future  -     Folate; Future  -     Lyme Total Antibody Profile with reflex to WB; Future  -     Ambulatory Referral to Podiatry; Future              Subjective:      Patient ID: Vanita Noriega is a 46 y o  male  CC:    Chief Complaint   Patient presents with    Numbness     left great toe and toe next to that, sometimes radiates to pad of foot x 6 months  mgb       HPI:    For the past 6 months patient's left great toe she seems numbness sleep  Negative weakness no history of trauma  The following portions of the patient's history were reviewed and updated as appropriate: allergies, current medications, past family history, past medical history, past social history, past surgical history and problem list       Review of Systems   Constitutional: Negative  HENT: Negative  Eyes: Negative  Respiratory: Negative  Cardiovascular: Negative  Gastrointestinal: Negative  Endocrine: Negative  Genitourinary: Negative  Musculoskeletal:        HPI   Skin: Negative  Allergic/Immunologic: Negative  Neurological:        HPI   Hematological: Negative  Psychiatric/Behavioral: Negative  Data to review:       Objective:    Vitals:    01/18/22 1051   BP: 138/82   BP Location: Left arm   Patient Position: Sitting   Cuff Size: Large   Temp: (!) 97 2 °F (36 2 °C)   TempSrc: Temporal   Weight: 92 7 kg (204 lb 6 oz)   Height: 6' 2" (1 88 m)        Physical Exam  Vitals and nursing note reviewed  Constitutional:       Appearance: Normal appearance  HENT:      Head: Normocephalic and atraumatic  Eyes:      General: No scleral icterus  Neck:      Vascular: No carotid bruit  Cardiovascular:      Rate and Rhythm: Normal rate and regular rhythm  Pulses: Normal pulses  Heart sounds: Normal heart sounds  Pulmonary:      Effort: Pulmonary effort is normal       Breath sounds: Normal breath sounds  Abdominal:      General: Bowel sounds are normal       Palpations: Abdomen is soft  Tenderness: There is no abdominal tenderness  Musculoskeletal:      Cervical back: Neck supple  Right lower leg: No edema  Left lower leg: No edema  Comments: Left great toe neurovascularly intact  Positive Tinel's posterior left medial malleolus   Skin:     Capillary Refill: Capillary refill takes less than 2 seconds  Neurological:      General: No focal deficit present  Mental Status: He is alert  Sensory: No sensory deficit  Motor: No weakness  Psychiatric:         Mood and Affect: Mood normal            BMI Counseling: Body mass index is 26 24 kg/m²  The BMI is above normal  Nutrition recommendations include moderation in carbohydrate intake and reducing intake of cholesterol  Exercise recommendations include exercising 3-5 times per week  Rationale for BMI follow-up plan is due to patient being overweight or obese  Depression Screening and Follow-up Plan: Patient was screened for depression during today's encounter  They screened negative with a PHQ-2 score of 0

## 2022-01-19 LAB
B BURGDOR IGG+IGM SER-ACNC: 17
HIV 1+2 AB+HIV1 P24 AG SERPL QL IA: NORMAL
PSA SERPL-MCNC: 0.7 NG/ML (ref 0–4)

## 2022-06-20 ENCOUNTER — TELEPHONE (OUTPATIENT)
Dept: FAMILY MEDICINE CLINIC | Facility: CLINIC | Age: 52
End: 2022-06-20

## 2022-06-20 NOTE — TELEPHONE ENCOUNTER
Patient called stated he is having a colitis flare up and was prescribed Rowasa and wanted provider to prescribe this medication called into [facility] on [address] please call patient

## 2022-10-12 PROBLEM — Z12.5 SCREENING FOR PROSTATE CANCER: Status: RESOLVED | Noted: 2022-01-18 | Resolved: 2022-10-12

## 2022-10-12 PROBLEM — Z00.00 HEALTH CARE MAINTENANCE: Status: RESOLVED | Noted: 2022-01-18 | Resolved: 2022-10-12

## 2023-11-29 ENCOUNTER — OFFICE VISIT (OUTPATIENT)
Dept: FAMILY MEDICINE CLINIC | Facility: CLINIC | Age: 53
End: 2023-11-29
Payer: COMMERCIAL

## 2023-11-29 ENCOUNTER — APPOINTMENT (OUTPATIENT)
Dept: RADIOLOGY | Facility: MEDICAL CENTER | Age: 53
End: 2023-11-29
Payer: COMMERCIAL

## 2023-11-29 ENCOUNTER — TELEPHONE (OUTPATIENT)
Dept: FAMILY MEDICINE CLINIC | Facility: CLINIC | Age: 53
End: 2023-11-29

## 2023-11-29 VITALS
BODY MASS INDEX: 27.43 KG/M2 | SYSTOLIC BLOOD PRESSURE: 126 MMHG | OXYGEN SATURATION: 97 % | WEIGHT: 207 LBS | HEART RATE: 86 BPM | HEIGHT: 73 IN | DIASTOLIC BLOOD PRESSURE: 88 MMHG

## 2023-11-29 DIAGNOSIS — M54.2 NECK PAIN: Primary | ICD-10-CM

## 2023-11-29 DIAGNOSIS — M54.2 NECK PAIN: ICD-10-CM

## 2023-11-29 DIAGNOSIS — E78.5 DYSLIPIDEMIA: ICD-10-CM

## 2023-11-29 PROCEDURE — 72050 X-RAY EXAM NECK SPINE 4/5VWS: CPT

## 2023-11-29 PROCEDURE — 99214 OFFICE O/P EST MOD 30 MIN: CPT | Performed by: NURSE PRACTITIONER

## 2023-11-29 NOTE — TELEPHONE ENCOUNTER
Pt called into office because he was in for a visit today and the med he was prescribed was placed on back order for 8 months, is there another alternative or can this possibly be sent into another pharmacy? please call pt back

## 2023-11-29 NOTE — PROGRESS NOTES
Name: Neela Garcia      : 1970      MRN: [id number]  Encounter Provider: CHADD Butler  Encounter Date: 2023   Encounter department: [place] [address] [address] [facility]     1. Neck pain  Assessment & Plan:  Patient symptoms are consistent with osteoarthritis of the cervical spine. X-ray of the cervical spine was ordered to confirm this and to assess for any other osseous abnormalities. In the meantime, the patient was prescribed Voltaren gel to be used on the affected area 4 times daily as needed for pain. Orders:  -     XR spine cervical complete 4 or 5 vw non injury; Future; Expected date: 2023  -     Diclofenac Sodium (VOLTAREN) 1 %; Apply 2 g topically 4 (four) times a day    2. Dyslipidemia  Assessment & Plan:  Patient was advised to continue to limit fatty and fried foods in his diet. BMI Counseling: Body mass index is 27.31 kg/m². The BMI is above normal. Nutrition recommendations include decreasing portion sizes, encouraging healthy choices of fruits and vegetables, moderation in carbohydrate intake and increasing intake of lean protein. Exercise recommendations include exercising 3-5 times per week and obtaining a gym membership. No pharmacotherapy was ordered. Rationale for BMI follow-up plan is due to patient being overweight or obese. Depression Screening and Follow-up Plan: Patient was screened for depression during today's encounter. They screened negative with a PHQ-2 score of 0. Tobacco Cessation Counseling: Tobacco cessation counseling was provided. The patient is sincerely urged to quit consumption of tobacco. He is not ready to quit tobacco.         Subjective      Neck pain: Patient reports over the past year he has been experiencing recurrent right-sided neck pain. He denies any radiation of the pain to his lower back, shoulder, or arm. He also denies any midline cervical pain.   He does have a full range of motion of his neck but does report increased pain when turning his head to the right side. Patient denies any falls or injuries to the area prior to the pain beginning. Patient does report frequently noting crepitus of his neck when moving it. Dyslipidemia: Patient's most recent lipid panel showed slightly elevated total cholesterol and LDL. Patient is not currently taking cholesterol medication. Review of Systems   Constitutional:  Negative for chills and fever. HENT:  Negative for ear pain and sore throat. Eyes:  Negative for pain and visual disturbance. Respiratory:  Negative for cough, chest tightness, shortness of breath and wheezing. Cardiovascular:  Negative for chest pain, palpitations and leg swelling. Gastrointestinal:  Negative for abdominal pain, constipation, diarrhea, nausea and vomiting. Endocrine: Negative for cold intolerance and heat intolerance. Genitourinary:  Negative for decreased urine volume, dysuria and hematuria. Musculoskeletal:  Positive for neck pain (right sided). Negative for arthralgias, back pain, myalgias and neck stiffness. Skin:  Negative for color change and rash. Allergic/Immunologic: Negative for environmental allergies. Neurological:  Negative for dizziness, seizures, syncope, weakness, light-headedness, numbness and headaches. Hematological:  Negative for adenopathy. Psychiatric/Behavioral:  Negative for confusion. The patient is not nervous/anxious. All other systems reviewed and are negative. Current Outpatient Medications on File Prior to Visit   Medication Sig   • mesalamine (ROWASA) 4 g Insert 60 mL (4 g total) into the rectum daily at bedtime (Patient not taking: Reported on 11/29/2023)       Objective     /88 (BP Location: Right arm, Patient Position: Sitting, Cuff Size: Large)   Pulse 86   Ht 6' 1" (1.854 m)   Wt 93.9 kg (207 lb)   SpO2 97%   BMI 27.31 kg/m²     Physical Exam  Vitals and nursing note reviewed.    Constitutional: General: He is not in acute distress. Appearance: Normal appearance. He is not ill-appearing. HENT:      Head: Normocephalic. Eyes:      Conjunctiva/sclera: Conjunctivae normal.   Cardiovascular:      Rate and Rhythm: Normal rate and regular rhythm. Pulses: Normal pulses. Carotid pulses are 2+ on the right side and 2+ on the left side. Radial pulses are 2+ on the right side and 2+ on the left side. Posterior tibial pulses are 2+ on the right side and 2+ on the left side. Heart sounds: Normal heart sounds. No murmur heard. Pulmonary:      Effort: Pulmonary effort is normal. No respiratory distress. Breath sounds: Normal breath sounds. No decreased breath sounds, wheezing, rhonchi or rales. Abdominal:      General: Abdomen is flat. Bowel sounds are normal. There is no distension. Palpations: Abdomen is soft. Tenderness: There is no abdominal tenderness. There is no guarding. Musculoskeletal:         General: Normal range of motion. Cervical back: Normal range of motion. Crepitus present. No swelling, edema, erythema, rigidity, spasms, tenderness or bony tenderness. Pain with movement present. No spinous process tenderness. Normal range of motion. Right lower leg: No edema. Left lower leg: No edema. Comments: No pain was noted with palpation of the midline cervical spine or bilateral cervical paraspinals. Patient did have a full range of motion of the neck but did report pain right-sided neck pain when turning his head to the right side. Crepitus was also noted when performing range of motion. Spurling's test was negative bilaterally for cervical nerve root compression. Skin:     General: Skin is warm and dry. Capillary Refill: Capillary refill takes less than 2 seconds. Neurological:      General: No focal deficit present. Mental Status: He is alert and oriented to person, place, and time.    Psychiatric:         Mood and Affect: Mood normal.         Behavior: Behavior normal.         Thought Content:  Thought content normal.         Judgment: Judgment normal.       CHADD Cole

## 2023-11-29 NOTE — ASSESSMENT & PLAN NOTE
Patient symptoms are consistent with osteoarthritis of the cervical spine. X-ray of the cervical spine was ordered to confirm this and to assess for any other osseous abnormalities. In the meantime, the patient was prescribed Voltaren gel to be used on the affected area 4 times daily as needed for pain.

## 2023-11-30 DIAGNOSIS — M54.2 NECK PAIN: Primary | ICD-10-CM

## 2023-11-30 NOTE — TELEPHONE ENCOUNTER
I called the pt for another pharmacy but pt states he ordered it online. Pt also states he would like to do PT.

## 2023-11-30 NOTE — TELEPHONE ENCOUNTER
Patient can purchase Voltaren gel over-the-counter.  There is also a generic of it called diclofenac.  The pharmacist should be able to point this out to the patient.  PT referral was placed.

## 2023-12-11 NOTE — PROGRESS NOTES
PT Evaluation     Today's date: 2023  Patient name: Neela Garcia  : 1970  MRN: [id number]  Referring provider: CHADD Butler  Dx:   Encounter Diagnosis     ICD-10-CM    1. Neck pain  M54.2 Ambulatory Referral to Physical Therapy                     Assessment  Assessment details: Neela Garcia is a 48 y.o. male presenting to physical therapy with chronic cervical spine pain and presents withpain, decreased range of motion, decreased activity tolerance, and postural dysfunction. Assessment reveals right upper cervical rotation deficit and DNF weakness. Secondary to these impairments, patient has increased difficulty performing ADL's, household chores, and  work related tasks. Eric Escudero would benefit from skilled PT to address these issues and maximize function. Thank you for the referral.    Impairments: abnormal muscle tone, abnormal or restricted ROM, abnormal movement, activity intolerance, impaired physical strength, lacks appropriate home exercise program and pain with function  Understanding of Dx/Px/POC: excellent  Goals  STG (4 weeks)  1. Patient will be independent with HEP  2. Decrease pain at worst by 2 points on NPRS  3. Increase right cervical rotation by 5 degrees for improved ADL function    LTG (8 weeks)  1. Decrease pain at worst from 4 points on NPRS  2. Increase R = L cervical rotation for improved driving  3. Increase DNF endurance strength to > 20 seconds for improved stability with desk work  4.  Increase FOTO score > or equal to expected outcome    Plan  Patient would benefit from: skilled PT  Planned therapy interventions: joint mobilization, manual therapy, neuromuscular re-education, patient education, postural training, strengthening, stretching, therapeutic exercise, home exercise program, functional ROM exercises and ADL training  Frequency: 2x week  Duration in weeks: 8  Treatment plan discussed with: patient      Subjective Evaluation    History of Present Illness  Mechanism of injury: Patient reports to outpatient PT secondary to chronic neck pain (R>L). Patient states that the pain began insidiously over time in the past year. Patient describes the pain as achy which is worse with right rotation and improved with rest.  Patient works as  ([employer]) and notes difficulty with work duties, ADL's and leisure functions as a result. Patients goals for PT are to decrease the pain and return to PLOF. Recurrent probem    Quality of life: good    Patient Goals  Patient goals for therapy: increased strength, independence with ADLs/IADLs, return to sport/leisure activities, increased motion and decreased pain    Pain  Current pain ratin  At best pain ratin  At worst pain ratin  Quality: dull ache and tight  Relieving factors: rest and relaxation  Exacerbated by: right cervical rotation.     Social Support    Employment status: working  Hand dominance: right      Diagnostic Tests  X-ray: abnormal  Treatments  No previous or current treatments  Previous treatment: medication        Objective     Concurrent Complaints  Negative for night pain and disturbed sleep    Postural Observations  Seated posture: fair  Standing posture: fair      Neurological Testing     Sensation   Cervical/Thoracic   Left   Intact: light touch    Right   Intact: light touch    Reflexes   Left   Biceps (C5/C6): normal (2+)  Brachioradialis (C6): normal (2+)    Right   Biceps (C5/C6): normal (2+)  Brachioradialis (C6): normal (2+)    Active Range of Motion   Cervical/Thoracic Spine       Cervical    Flexion:  Restriction level: minimal  Extension:  Restriction level: minimal  Left lateral flexion:  Restriction level: moderate  Right lateral flexion:  Restriction level moderate  Left rotation: 72 degrees  Right rotation: 64 degrees       Joint Play     Hypomobile: C2, C3, C4, C5, C6, C7 and T1     Strength/Myotome Testing   Cervical Spine     Left   Normal strength    Right   Normal strength    Tests   Cervical   Positive neck flexor muscle endurance test.  Negative vertical compression and cervical distraction. Left   Negative Spurling's Test A. Right   Positive cervical flexion-rotation test.   Negative Spurling's Test A. Lumbar   Negative vertical compression.      Additional Tests Details  10 degree rotation deficit to the right at C1/C2    DNF endurance test: 5 seconds             Precautions: N/A      Manuals 12/11            C2 NAG to the right             Thoracic PA SUDHEER                                       Neuro Re-Ed                                                                                                        Ther Ex             UBE w/ postural correction             R SNAG 10 x10"            DNF lifts in supine 2x10 x5"            Postural awareness education performed            Prone cervical retractions with T/Y                                                    Ther Activity                                       Gait Training                                       Modalities

## 2023-12-12 ENCOUNTER — EVALUATION (OUTPATIENT)
Dept: PHYSICAL THERAPY | Facility: CLINIC | Age: 53
End: 2023-12-12
Payer: COMMERCIAL

## 2023-12-12 DIAGNOSIS — M54.2 NECK PAIN: Primary | ICD-10-CM

## 2023-12-12 PROCEDURE — 97161 PT EVAL LOW COMPLEX 20 MIN: CPT | Performed by: PHYSICAL THERAPIST

## 2023-12-12 PROCEDURE — 97110 THERAPEUTIC EXERCISES: CPT | Performed by: PHYSICAL THERAPIST

## 2024-01-02 ENCOUNTER — OFFICE VISIT (OUTPATIENT)
Dept: FAMILY MEDICINE CLINIC | Facility: CLINIC | Age: 54
End: 2024-01-02
Payer: COMMERCIAL

## 2024-01-02 VITALS
OXYGEN SATURATION: 96 % | WEIGHT: 212.38 LBS | TEMPERATURE: 97.7 F | BODY MASS INDEX: 28.02 KG/M2 | DIASTOLIC BLOOD PRESSURE: 86 MMHG | HEART RATE: 85 BPM | SYSTOLIC BLOOD PRESSURE: 130 MMHG

## 2024-01-02 DIAGNOSIS — M47.812 OSTEOARTHRITIS OF CERVICAL SPINE, UNSPECIFIED SPINAL OSTEOARTHRITIS COMPLICATION STATUS: ICD-10-CM

## 2024-01-02 DIAGNOSIS — M54.2 NECK PAIN: Primary | ICD-10-CM

## 2024-01-02 PROCEDURE — 99214 OFFICE O/P EST MOD 30 MIN: CPT | Performed by: NURSE PRACTITIONER

## 2024-01-02 RX ORDER — METHOCARBAMOL 500 MG/1
500 TABLET, FILM COATED ORAL
Qty: 30 TABLET | Refills: 0 | Status: SHIPPED | OUTPATIENT
Start: 2024-01-02

## 2024-01-02 RX ORDER — CELECOXIB 100 MG/1
100 CAPSULE ORAL DAILY
Qty: 30 CAPSULE | Refills: 1 | Status: SHIPPED | OUTPATIENT
Start: 2024-01-02

## 2024-01-02 NOTE — ASSESSMENT & PLAN NOTE
Patient has been doing therapy now for 3 weeks. Has another evaluation on 1/5/2024.   Has not had any improvement. Will try low dose celebrex and muscle relaxer recheck in 3-4 more weeks.

## 2024-01-02 NOTE — PROGRESS NOTES
Name: Jasbir Tsai      : 1970      MRN: [id number]  Encounter Provider: CHADD Martinez  Encounter Date: 2024   Encounter department: [facility]    Assessment & Plan     1. Neck pain  Assessment & Plan:  Patient has been doing therapy now for 3 weeks. Has another evaluation on 2024.   Has not had any improvement. Will try low dose celebrex and muscle relaxer recheck in 3-4 more weeks.     Orders:  -     celecoxib (CeleBREX) 100 mg capsule; Take 1 capsule (100 mg total) by mouth daily  -     methocarbamol (ROBAXIN) 500 mg tablet; Take 1 tablet (500 mg total) by mouth daily at bedtime as needed for muscle spasms    2. Osteoarthritis of cervical spine, unspecified spinal osteoarthritis complication status  Assessment & Plan:  Same as above    Orders:  -     celecoxib (CeleBREX) 100 mg capsule; Take 1 capsule (100 mg total) by mouth daily  -     methocarbamol (ROBAXIN) 500 mg tablet; Take 1 tablet (500 mg total) by mouth daily at bedtime as needed for muscle spasms           Subjective      Patient was seen about one month ago  He went out drinking with his friends and had a horrible headaches. And now 4 times he has head severe migraine after having 2 beers.   He states he the onset is almost sudden. He has headache currently at a 1-2. After drinking he reports worse pain in his life. Severe that he also felt nauseated.   He has radiating right parietal and temporal headache and his neck hurts him on the right side ar the base of his head.   He was seen by physical therapy and also been doing the exercises at home which is not providing relief.       Review of Systems   Constitutional:  Negative for fatigue.   Eyes:  Negative for visual disturbance.   Gastrointestinal:  Positive for nausea.   Musculoskeletal:  Positive for neck pain and neck stiffness.   Neurological:  Positive for headaches. Negative for dizziness, weakness, light-headedness and numbness.       Current  Outpatient Medications on File Prior to Visit   Medication Sig   • [DISCONTINUED] Diclofenac Sodium (VOLTAREN) 1 % Apply 2 g topically 4 (four) times a day (Patient not taking: Reported on 1/2/2024)   • [DISCONTINUED] mesalamine (ROWASA) 4 g Insert 60 mL (4 g total) into the rectum daily at bedtime (Patient not taking: Reported on 11/29/2023)       Objective     /86 (BP Location: Left arm, Patient Position: Sitting, Cuff Size: Large)   Pulse 85   Temp 97.7 °F (36.5 °C) (Temporal)   Wt 96.3 kg (212 lb 6 oz)   SpO2 96%   BMI 28.02 kg/m²     Physical Exam  Vitals and nursing note reviewed.   Constitutional:       Appearance: Normal appearance. He is well-developed. He is not ill-appearing.   HENT:      Head: Normocephalic and atraumatic.   Eyes:      Extraocular Movements: Extraocular movements intact.      Conjunctiva/sclera: Conjunctivae normal.      Pupils: Pupils are equal.   Cardiovascular:      Rate and Rhythm: Normal rate and regular rhythm.      Heart sounds: S1 normal and S2 normal. No murmur heard.  Pulmonary:      Effort: Pulmonary effort is normal. No respiratory distress.      Breath sounds: Normal breath sounds.   Musculoskeletal:      Cervical back: No spinous process tenderness or muscular tenderness. Decreased range of motion.   Neurological:      Mental Status: He is alert and oriented to person, place, and time.   Psychiatric:         Mood and Affect: Mood normal.         Thought Content: Thought content normal.       CHADD Martinez

## 2024-01-05 ENCOUNTER — EVALUATION (OUTPATIENT)
Dept: PHYSICAL THERAPY | Facility: CLINIC | Age: 54
End: 2024-01-05
Payer: COMMERCIAL

## 2024-01-05 DIAGNOSIS — M54.2 NECK PAIN: Primary | ICD-10-CM

## 2024-01-05 PROCEDURE — 97140 MANUAL THERAPY 1/> REGIONS: CPT | Performed by: PHYSICAL THERAPIST

## 2024-01-05 PROCEDURE — 97110 THERAPEUTIC EXERCISES: CPT | Performed by: PHYSICAL THERAPIST

## 2024-01-05 NOTE — PROGRESS NOTES
"Daily Note     Today's date: 2024  Patient name: Jasbir Tsai  : 1970  MRN: [id number]  Referring provider: Iftikhar Otero CRNP  Dx:   Encounter Diagnosis     ICD-10-CM    1. Neck pain  M54.2                      Subjective: Patient reports HEP compliance and states that there has been no change in neck symptoms.  Notes that cervical headaches appear to correlate with drinking a beer or two.       Objective: See treatment diary below    R cervical rotation pre-tx: 62 degrees to the right  R cervical rotation post-tx: 67 degrees      Assessment: Cervical rotation and \"popping\" are unchanged since IE per goniometric measurements.  Good response and tolerance to manuals with increased cervical rotation thereafter.  Progressed DNF hold times and progressed exercises noted with good tolerance and mechanics.  Will continue HEP with appropriate changes.        Plan: Continue per plan of care.      Precautions: N/A      Manuals  1           C2 NAG to the right (down glide & upglide on the left)  G4           Thoracic PA JM  G4           Assessment  10'           Suboccipital release  5'           Neuro Re-Ed                                                                                                        Ther Ex             UBE w/ postural correction  L4 2'/2'           R SNAG 10 x10\" 10 x10\"           DNF lifts in supine 2x10 x5\" 2x5 x10\"           Postural awareness education performed            Prone cervical retractions with T/Y  2x10 ea.                                                  Ther Activity                                       Gait Training                                       Modalities                                            "

## 2024-01-15 ENCOUNTER — OFFICE VISIT (OUTPATIENT)
Dept: PHYSICAL THERAPY | Facility: CLINIC | Age: 54
End: 2024-01-15
Payer: COMMERCIAL

## 2024-01-15 DIAGNOSIS — M54.2 NECK PAIN: Primary | ICD-10-CM

## 2024-01-15 PROCEDURE — 97140 MANUAL THERAPY 1/> REGIONS: CPT | Performed by: PHYSICAL THERAPIST

## 2024-01-15 PROCEDURE — 97110 THERAPEUTIC EXERCISES: CPT | Performed by: PHYSICAL THERAPIST

## 2024-01-15 NOTE — PROGRESS NOTES
"Daily Note     Today's date: 1/15/2024  Patient name: Jasbir Tsai  : 1970  MRN: [id number]  Referring provider: Iftikhar Otero CRNP  Dx:   Encounter Diagnosis     ICD-10-CM    1. Neck pain  M54.2                      Subjective: Patient reports some improvement since last session regarding less neck discomfort and states that he was able to perform SOR but required a few days rest thereafter.        Objective: See treatment diary below    R cervical rotation: 58 degrees    L cervical rotation: 70 degrees      Assessment: Patient demonstrating continued upper cervical rotation deficit to the right pre-tx but was WNL and equal following.  Performed up and down glides in sitting with fair tolerance and significant improvement in cervical rotation thereafter.        Plan: Continue per plan of care.      Precautions: N/A      Manuals 12/11 1/5 1/15          C2 NAG to the right (down glide & upglide on the left)  G4 G4          Thoracic PA JM  G4 G4          Assessment  10'           Suboccipital release  5' 5'          Seated up glide on the left for right rotation   G4          Seated down glide on the right for right rotation   G4          Neuro Re-Ed                                                                                                        Ther Ex             UBE w/ postural correction  L4 2'/2' L4 2'/2'          R SNAG 10 x10\" 10 x10\" 10 x10\"          DNF lifts in supine 2x10 x5\" 2x5 x10\"           Postural awareness education performed            Prone cervical retractions with T/Y  2x10 ea.           Self down glide on the right with rotation   x10                                    Ther Activity                                       Gait Training                                       Modalities                                            "

## 2024-01-22 ENCOUNTER — OFFICE VISIT (OUTPATIENT)
Dept: PHYSICAL THERAPY | Facility: CLINIC | Age: 54
End: 2024-01-22
Payer: COMMERCIAL

## 2024-01-22 DIAGNOSIS — M54.2 NECK PAIN: Primary | ICD-10-CM

## 2024-01-22 PROCEDURE — 97140 MANUAL THERAPY 1/> REGIONS: CPT | Performed by: PHYSICAL THERAPIST

## 2024-01-22 PROCEDURE — 97012 MECHANICAL TRACTION THERAPY: CPT | Performed by: PHYSICAL THERAPIST

## 2024-01-22 NOTE — PROGRESS NOTES
"Daily Note     Today's date: 2024  Patient name: Jasbir Tsai  : 1970  MRN: [id number]  Referring provider: Iftikhar Otero CRNP  Dx:   Encounter Diagnosis     ICD-10-CM    1. Neck pain  M54.2                      Subjective: Patient states that last week may have \"provided false hope\" in the fact that the neck pain has increased again.       Objective: See treatment diary below      Assessment: Overall right cervical rotation ROM remains improved but discomfort in the upper cervical spine remains.  No particular manuals reduce discomfort in the neck today and attempted mechanical traction per reduction in cervical spine discomfort with manual distraction.        Plan: Continue per plan of care.      Precautions: N/A      Manuals 12/11 1/5 1/15 1/22         C2 NAG to the right (down glide & upglide on the left)  G4 G4 G4         Thoracic PA JM  G4 G4          Assessment  10'           Suboccipital release  5' 5' 5'         Seated up glide on the left for right rotation   G4 G4         Seated down glide on the right for right rotation   G4 G4         Neuro Re-Ed                                                                                                        Ther Ex             UBE w/ postural correction  L4 2'/2' L4 2'/2' L4 2'/2'         R SNAG 10 x10\" 10 x10\" 10 x10\" 10 x10\"         DNF lifts in supine 2x10 x5\" 2x5 x10\"  2x5 x10\"         Postural awareness education performed            Prone cervical retractions with T/Y  2x10 ea.           Self down glide on the right with rotation   x10 x10                                   Ther Activity                                       Gait Training                                       Modalities             Mechanical traction    25# 10'                           "

## 2024-01-24 ENCOUNTER — RA CDI HCC (OUTPATIENT)
Dept: OTHER | Facility: HOSPITAL | Age: 54
End: 2024-01-24

## 2024-01-25 ENCOUNTER — OFFICE VISIT (OUTPATIENT)
Dept: FAMILY MEDICINE CLINIC | Facility: CLINIC | Age: 54
End: 2024-01-25
Payer: COMMERCIAL

## 2024-01-25 VITALS
BODY MASS INDEX: 28.07 KG/M2 | HEIGHT: 73 IN | HEART RATE: 86 BPM | WEIGHT: 211.8 LBS | SYSTOLIC BLOOD PRESSURE: 128 MMHG | DIASTOLIC BLOOD PRESSURE: 86 MMHG

## 2024-01-25 DIAGNOSIS — M54.2 NECK PAIN: Primary | ICD-10-CM

## 2024-01-25 PROCEDURE — 99214 OFFICE O/P EST MOD 30 MIN: CPT | Performed by: NURSE PRACTITIONER

## 2024-01-25 RX ORDER — METHYLPREDNISOLONE 4 MG/1
TABLET ORAL
Qty: 21 EACH | Refills: 0 | Status: SHIPPED | OUTPATIENT
Start: 2024-01-25

## 2024-01-25 NOTE — PROGRESS NOTES
"Name: Jasbir Tsai      : 1970      MRN: [id number]  Encounter Provider: CHADD Martinez  Encounter Date: 2024   Encounter department: [facility]    Assessment & Plan     1. Neck pain  Assessment & Plan:  Previous xray showed djd, has been doing therapy now for 6 weeks.   Voltaren not effective  Celebrex and robaxin not effective either.   Will order MRI and trial course of steroids.     Orders:  -     MRI cervical spine wo contrast; Future; Expected date: 2024  -     methylPREDNISolone 4 MG tablet therapy pack; Use as directed on package           Subjective      Patient presents today for neck pain follow up.   He reports the neck pain is about the exact same.   He has good days and bad days   Has been taking celebrex and muscle relaxer and noticed no change  Still in therapy and doing exercises.     Right sided more pain   Still having headache nearly all the time. Rates about a 1.   Still radiating from back right side if his head to the entire right side   Occasionally getting sharp pain in the right ear and feels sinus pressure as well on the right side       Review of Systems   Constitutional:  Negative for chills, diaphoresis, fatigue and fever.   HENT:  Positive for ear pain and sinus pressure.    Musculoskeletal:  Positive for neck pain and neck stiffness.   Neurological:  Positive for headaches. Negative for weakness and numbness.       Current Outpatient Medications on File Prior to Visit   Medication Sig   • celecoxib (CeleBREX) 100 mg capsule Take 1 capsule (100 mg total) by mouth daily   • methocarbamol (ROBAXIN) 500 mg tablet Take 1 tablet (500 mg total) by mouth daily at bedtime as needed for muscle spasms       Objective     /86 (BP Location: Left arm, Patient Position: Sitting, Cuff Size: Adult)   Pulse 86   Ht 6' 1\" (1.854 m)   Wt 96.1 kg (211 lb 12.8 oz)   BMI 27.94 kg/m²     Physical Exam  Vitals and nursing note reviewed. "   Constitutional:       Appearance: Normal appearance. He is well-developed. He is not ill-appearing.   HENT:      Head: Normocephalic and atraumatic.      Right Ear: Tympanic membrane normal.      Left Ear: Tympanic membrane normal.   Eyes:      Extraocular Movements: Extraocular movements intact.      Conjunctiva/sclera: Conjunctivae normal.      Pupils: Pupils are equal.   Neck:     Cardiovascular:      Rate and Rhythm: Normal rate and regular rhythm.      Heart sounds: S1 normal and S2 normal. No murmur heard.  Pulmonary:      Effort: Pulmonary effort is normal. No respiratory distress.      Breath sounds: Normal breath sounds.   Musculoskeletal:      Cervical back: Muscular tenderness present. Decreased range of motion.   Lymphadenopathy:      Cervical: No cervical adenopathy.   Neurological:      Mental Status: He is alert and oriented to person, place, and time.   Psychiatric:         Mood and Affect: Mood normal.         Thought Content: Thought content normal.       CHADD Martinez

## 2024-01-25 NOTE — ASSESSMENT & PLAN NOTE
Previous xray showed djd, has been doing therapy now for 6 weeks.   Voltaren not effective  Celebrex and robaxin not effective either.   Will order MRI and trial course of steroids.

## 2024-01-31 ENCOUNTER — APPOINTMENT (OUTPATIENT)
Dept: PHYSICAL THERAPY | Facility: CLINIC | Age: 54
End: 2024-01-31
Payer: COMMERCIAL

## 2024-02-05 ENCOUNTER — HOSPITAL ENCOUNTER (OUTPATIENT)
Facility: MEDICAL CENTER | Age: 54
Discharge: HOME | End: 2024-02-05
Payer: COMMERCIAL

## 2024-02-05 DIAGNOSIS — M54.2 NECK PAIN: ICD-10-CM

## 2024-02-05 PROCEDURE — G1004 CDSM NDSC: HCPCS

## 2024-02-05 PROCEDURE — 72141 MRI NECK SPINE W/O DYE: CPT

## 2024-02-12 ENCOUNTER — TELEPHONE (OUTPATIENT)
Age: 54
End: 2024-02-12

## 2024-02-12 NOTE — TELEPHONE ENCOUNTER
Pt called in stating he saw the MRI results came in. Pt would like a call back with the results. Please advise.

## 2024-02-13 DIAGNOSIS — M47.812 FACET ARTHROPATHY, CERVICAL: Primary | ICD-10-CM

## 2024-02-13 DIAGNOSIS — M54.2 NECK PAIN: ICD-10-CM

## 2024-02-14 NOTE — TELEPHONE ENCOUNTER
Pt called back regarding MRI. Gave results per HN. Pt stated he would try a few visits to his chiropractor, and if that did not work he would make an appointment with Pain Management.

## 2024-03-21 ENCOUNTER — CONSULT (OUTPATIENT)
Dept: PAIN MEDICINE | Facility: MEDICAL CENTER | Age: 54
End: 2024-03-21
Payer: COMMERCIAL

## 2024-03-21 VITALS
BODY MASS INDEX: 27.04 KG/M2 | HEIGHT: 73 IN | WEIGHT: 204 LBS | HEART RATE: 74 BPM | DIASTOLIC BLOOD PRESSURE: 84 MMHG | OXYGEN SATURATION: 96 % | SYSTOLIC BLOOD PRESSURE: 123 MMHG

## 2024-03-21 DIAGNOSIS — M54.2 NECK PAIN: ICD-10-CM

## 2024-03-21 DIAGNOSIS — M47.812 FACET ARTHROPATHY, CERVICAL: ICD-10-CM

## 2024-03-21 PROCEDURE — 99244 OFF/OP CNSLTJ NEW/EST MOD 40: CPT | Performed by: PHYSICAL MEDICINE & REHABILITATION

## 2024-03-21 NOTE — PROGRESS NOTES
Addended by: ISRRAEL COLLINS on: 2/12/2018 03:39 PM     Modules accepted: Orders     Assessment  1. Facet arthropathy, cervical    2. Neck pain        Plan  The patient has been experiencing moderate to severe axial spine pain that is causing functional deficit.  The pain has been present for at least 3 months and is not improving with conservative care including one or more of the following: home exercise regimen, physician directed home exercise program, physical therapy, or chiropractic care.  Currently the patient is not experiencing any radicular features nor neurogenic claudication.  Non-facet pathology has been ruled out on clinical evaluation.     We will schedule the patient for right C2-3 medial branch nerve blocks with intention of moving forward towards radiofrequency ablation if there is an appropriate diagnostic response. The initial blocks will be performed with 2% lidocaine and if an appropriate response is obtained upon review of the patient's pain diary, a confirmatory block will be scheduled with 0.5% bupivacaine.    In the office today, we reviewed the nature of facet joint pathology in depth using a spine model. We discussed the approach we would use for the injections and provided literature for home review. The patient understands the risks associated with the procedure including bleeding, infection, tissue injury, and allergic reaction and provided verbal informed consent in the office today.    My impressions and treatment recommendations were discussed in detail with the patient who verbalized understanding and had no further questions.  Discharge instructions were provided. I personally saw and examined the patient and I agree with the above discussed plan of care.    Orders Placed This Encounter   Procedures    FL spine and pain procedure     Standing Status:   Future     Standing Expiration Date:   3/21/2025     Order Specific Question:   Reason for Exam:     Answer:   (R) C2-3 MBB#1     Order Specific Question:   Anticoagulant hold needed?     Answer:   no     No orders  of the defined types were placed in this encounter.      History of Present Illness    Jasbir Tsai is a 54 y.o. male seen in consultation at the request of CHADD Martinez regarding chronic neck pain.  The patient's been experiencing the symptoms for over a year without any specific inciting event or trauma but he did notice that this developed after COVID.  He is describing moderate to severe intensity pain rated as a 5-6/10 which is constant without any typical pattern described as a dull aching sensation localized to the right side of his neck without radiation down the arm.    Aggravating factors include coughing sneezing and looking over the right shoulder such as when backing up from a parking space.    Diagnostic studies include x-rays as well as MRI.  The MRI does reveal facet arthropathy at the C2-3 joint on the right likely accounting for his current symptoms.    He has tried physical therapy as well as chiropractic and massage without relief.    Social history positive for tobacco and weekend alcohol consumption negative for marijuana.    Currently not using any prescription pain medications.  He did try an oral steroid pack which may have provided some benefit.  He does notice significant crepitus sensation with cervical range of motion activities.    I have personally reviewed and/or updated the patient's past medical history, past surgical history, family history, social history, current medications, allergies, and vital signs today.     Review of Systems   Constitutional:  Negative for fever and unexpected weight change.   HENT:  Negative for trouble swallowing.    Eyes:  Negative for visual disturbance.   Respiratory:  Negative for shortness of breath and wheezing.    Cardiovascular:  Negative for chest pain and palpitations.   Gastrointestinal:  Negative for constipation, diarrhea, nausea and vomiting.   Endocrine: Negative for cold intolerance, heat intolerance and polydipsia.    Genitourinary:  Negative for difficulty urinating and frequency.   Musculoskeletal:  Positive for myalgias, neck pain and neck stiffness. Negative for arthralgias, gait problem and joint swelling.   Skin:  Negative for rash.   Neurological:  Negative for dizziness, seizures, syncope, weakness and headaches.   Hematological:  Does not bruise/bleed easily.   Psychiatric/Behavioral:  Negative for dysphoric mood.    All other systems reviewed and are negative.      Patient Active Problem List   Diagnosis    Family hx colonic polyps    Proctitis    Cigar smoker    Dyslipidemia    Overweight (BMI 25.0-29.9)    Neck pain    Osteoarthritis of cervical spine       Past Medical History:   Diagnosis Date    Colitis     Headache(784.0) 12/12/23    Inflammatory bowel disease     Proctitis 01/21/2020    Rectal bleed        Past Surgical History:   Procedure Laterality Date    COLONOSCOPY      KNEE SURGERY      [place] COLONOSCOPY FLX DX W/COLLJ SPEC WHEN PFRMD N/A 01/11/2019    Procedure: COLONOSCOPY;  Surgeon: Stanford James MD;  Location: [facility];  Service: Colorectal       Family History   Problem Relation Age of Onset    Heart disease Mother     No Known Problems Father     Colon cancer Neg Hx        Social History     Occupational History    Not on file   Tobacco Use    Smoking status: Former     Current packs/day: 0.00     Types: Cigarettes, Cigars     Quit date: 1/1/2021     Years since quitting: 3.2    Smokeless tobacco: Former     Types: Snuff     Quit date: 1/1/1989    Tobacco comments:     cigars   Substance and Sexual Activity    Alcohol use: Yes     Alcohol/week: 3.0 standard drinks of alcohol     Types: 3 Cans of beer per week     Comment: weekends    Drug use: No    Sexual activity: Yes     Partners: Female       Current Outpatient Medications on File Prior to Visit   Medication Sig    celecoxib (CeleBREX) 100 mg capsule Take 1 capsule (100 mg total) by mouth daily (Patient not taking: Reported on 3/21/2024)     "methocarbamol (ROBAXIN) 500 mg tablet Take 1 tablet (500 mg total) by mouth daily at bedtime as needed for muscle spasms (Patient not taking: Reported on 3/21/2024)    methylPREDNISolone 4 MG tablet therapy pack Use as directed on package (Patient not taking: Reported on 3/21/2024)     No current facility-administered medications on file prior to visit.       No Known Allergies    Physical Exam    /84   Pulse 74   Ht 6' 1\" (1.854 m)   Wt 92.5 kg (204 lb)   SpO2 96%   BMI 26.91 kg/m²     Constitutional: normal, well developed, well nourished, alert, in no distress and non-toxic and no overt pain behavior.  Eyes: anicteric  HEENT: grossly intact  Neck: supple, symmetric, trachea midline and no masses   Pulmonary:even and unlabored  Cardiovascular:No edema or pitting edema present  Psychiatric:Mood and affect appropriate  Neurologic:Cranial Nerves II-XII grossly intact  Musculoskeletal:normal, except for pain with cervical range of motion and limited range with rotation to the right    Imaging    MRI cervical spine wo contrast: Result Notes     Sadaf Poe  2/15/2024  5:10 PM EST       Pt notified and phoned back in agreeance.    Sadaf Poe  2/14/2024  3:54 PM EST       Attempted to call pt call dropped 2 times will attempt again later.    CHADD Sutton  2/13/2024  4:35 PM EST       Patient MRI is consistent with severe arthritis that currently appears active because there is swelling present hopefully the steroid helped his symptoms if not we have options to consider pain management referral for injections or the severity might be enough to consult neurosurgery. I would suggest pain management first. I did place referral.            Study Result    Narrative & Impression   MRI CERVICAL SPINE WITHOUT CONTRAST     INDICATION: M54.2: Cervicalgia.     COMPARISON:  None.     TECHNIQUE:  Multiplanar, multisequence imaging of the cervical spine was performed. .        IMAGE QUALITY:  " Diagnostic     FINDINGS:     ALIGNMENT: There is reversal of the normal cervical lordosis centered at C5-C6. There is no significant spondylolisthesis.     MARROW SIGNAL:  Normal marrow signal is identified within the visualized bony structures.  No discrete marrow lesion.     CERVICAL AND VISUALIZED THORACIC CORD:  Normal signal within the visualized cord.     PREVERTEBRAL AND PARASPINAL SOFT TISSUES:  Normal.     VISUALIZED POSTERIOR FOSSA:  The visualized posterior fossa demonstrates no abnormal signal.     CERVICAL DISC SPACES:     C2-C3: There is right-sided facet arthrosis. There is moderate to severe right foraminal narrowing. There is no significant canal stenosis or left foraminal narrowing. There is edema on both sides of the right facet joint consistent with active facet   arthropathy.     C3-C4:  Normal.     C4-C5:  Normal.     C5-C6: There is a disc osteophyte complex with a right sided uncovertebral spurring, asymmetric to the right. There is severe right foraminal narrowing. There is mild canal stenosis with contact of the cord. There is mild left foraminal encroachment.     C6-C7: There is a disc osteophyte complex with uncovertebral spurring. There is mild mass effect on the thecal sac. There is mild to moderate bilateral foraminal narrowing.     C7-T1: Minimal bulge without significant canal stenosis or foraminal narrowing.     UPPER THORACIC DISC SPACES:  Normal.     OTHER FINDINGS:  None.     IMPRESSION:     Multilevel cervical spondylosis, as described above.     Severe right foraminal narrowing is present at C5-C6.     Edema on both sides of the right facet joint at C2-C3 is consistent with active facet arthropathy. Moderate to severe right foraminal narrowing is present at this level.        Workstation performed: [id number]

## 2024-04-16 ENCOUNTER — HOSPITAL ENCOUNTER (OUTPATIENT)
Dept: RADIOLOGY | Facility: MEDICAL CENTER | Age: 54
Discharge: HOME | End: 2024-04-16
Payer: COMMERCIAL

## 2024-04-16 VITALS
RESPIRATION RATE: 18 BRPM | HEART RATE: 74 BPM | DIASTOLIC BLOOD PRESSURE: 91 MMHG | OXYGEN SATURATION: 96 % | SYSTOLIC BLOOD PRESSURE: 136 MMHG | TEMPERATURE: 98.2 F

## 2024-04-16 DIAGNOSIS — M47.812 FACET ARTHROPATHY, CERVICAL: ICD-10-CM

## 2024-04-16 PROCEDURE — 64490 INJ PARAVERT F JNT C/T 1 LEV: CPT | Performed by: PHYSICAL MEDICINE & REHABILITATION

## 2024-04-16 RX ADMIN — Medication 1.5 ML: at 16:17

## 2024-04-16 NOTE — DISCHARGE INSTR - LAB

## 2024-04-16 NOTE — H&P
History of Present Illness: The patient is a 54 y.o. male who presents with complaints of right neck pain    Past Medical History:   Diagnosis Date    Colitis     Headache(784.0) 12/12/23    Inflammatory bowel disease     Proctitis 01/21/2020    Rectal bleed        Past Surgical History:   Procedure Laterality Date    COLONOSCOPY      KNEE SURGERY      AK COLONOSCOPY FLX DX W/COLLJ SPEC WHEN PFRMD N/A 01/11/2019    Procedure: COLONOSCOPY;  Surgeon: Stanford James MD;  Location: [facility];  Service: Colorectal         Current Outpatient Medications:     celecoxib (CeleBREX) 100 mg capsule, Take 1 capsule (100 mg total) by mouth daily (Patient not taking: Reported on 3/21/2024), Disp: 30 capsule, Rfl: 1    methocarbamol (ROBAXIN) 500 mg tablet, Take 1 tablet (500 mg total) by mouth daily at bedtime as needed for muscle spasms (Patient not taking: Reported on 3/21/2024), Disp: 30 tablet, Rfl: 0    methylPREDNISolone 4 MG tablet therapy pack, Use as directed on package (Patient not taking: Reported on 3/21/2024), Disp: 21 each, Rfl: 0    No Known Allergies    Physical Exam:   Vitals:    04/16/24 1553   BP: 145/92   Pulse: 82   Resp: 18   Temp: 98.2 °F (36.8 °C)   SpO2: 94%     General: Awake, Alert, Oriented x 3, Mood and affect appropriate  Respiratory: Respirations even and unlabored  Cardiovascular: Peripheral pulses intact; no edema  Musculoskeletal Exam: right neck pain    ASA Score: 3    Patient/Chart Verification  Patient ID Verified: Verbal  ID Band Applied: No  Consents Confirmed: Procedural, To be obtained in the Pre-Procedure area  H&P( within 30 days) Verified: To be obtained in the Pre-Procedure area  Allergies Reviewed: Yes  Anticoag/NSAID held?: No  Currently on antibiotics?: No    Assessment:   1. Facet arthropathy, cervical        Plan: (R) C2-3 MBB#1

## 2024-04-17 ENCOUNTER — TELEPHONE (OUTPATIENT)
Dept: RADIOLOGY | Facility: MEDICAL CENTER | Age: 54
End: 2024-04-17

## 2024-04-17 NOTE — TELEPHONE ENCOUNTER
Pain diary reviewed by Dr. Morse; Schedule follow up office visit with physician assistant to review cervical MBB Results.

## 2024-04-26 ENCOUNTER — OFFICE VISIT (OUTPATIENT)
Dept: PAIN MEDICINE | Facility: MEDICAL CENTER | Age: 54
End: 2024-04-26
Payer: COMMERCIAL

## 2024-04-26 VITALS
DIASTOLIC BLOOD PRESSURE: 84 MMHG | BODY MASS INDEX: 27.17 KG/M2 | SYSTOLIC BLOOD PRESSURE: 127 MMHG | OXYGEN SATURATION: 97 % | HEART RATE: 67 BPM | HEIGHT: 73 IN | WEIGHT: 205 LBS

## 2024-04-26 DIAGNOSIS — M47.812 FACET ARTHROPATHY, CERVICAL: ICD-10-CM

## 2024-04-26 DIAGNOSIS — M54.12 CERVICAL RADICULITIS: Primary | ICD-10-CM

## 2024-04-26 PROCEDURE — 99214 OFFICE O/P EST MOD 30 MIN: CPT

## 2024-04-26 NOTE — PROGRESS NOTES
Assessment:  1. Cervical radiculitis    2. Facet arthropathy, cervical        Plan:  Schedule for right C7-T1 CARMEN. Complete benefits and risks of this procedure including hyperglycemia, bleeding, infection, local tissue reaction, nerve injury and allergic reaction were discussed at today's visit. The approach was demonstrated using models and literature was provided for home review. The patient was agreeable, verbalized uderstanding, and wishes to proceed with scheduling the procedure.  Unfortunately, the patient did not have a positive response to the right C2-C3 MBB #1, therefore we will not move forward to radiofrequency ablation.   Follow-up after injection, or sooner if needed.    My impressions and treatment recommendations were discussed in detail with the patient who verbalized understanding and had no further questions.  Discharge instructions were provided. I personally saw and examined the patient and I agree with the above discussed plan of care.    Orders Placed This Encounter   Procedures    FL spine and pain procedure     Standing Status:   Future     Standing Expiration Date:   4/26/2028     Order Specific Question:   Reason for Exam:     Answer:   Right C7-T1 CARMEN     Order Specific Question:   Anticoagulant hold needed?     Answer:   No     No orders of the defined types were placed in this encounter.      History of Present Illness:  Jasbir Tsai is a 54 y.o. male who presents for a follow up office visit in regards to ongoing neck pain that is primarily right-sided.  Patient states that this episode of pain began over a year ago when he was sick with COVID and he has been in pain ever since.  Patient reports that his pain is localized to this area and sometimes radiates out into the right shoulder region.  His pain is constant, and typically worse in the morning.  He is currently rating his pain a 6/10 in intensity.  He describes the quality of this pain as dull/aching.  He denies any upper  extremity weakness or numbness associated with this pain complaint.    Patient recently underwent right C2-C3 MBB #1 without relief. Patient states that for the first few minutes following the procedure, he thought maybe he was getting some relief.  Overall, he reports no meaningful relief following this procedure. Patient very frustrated with these results and is wondering why there is nothing we can do to get rid of his arthritis.  Explained to the patient that we are managing the pain caused by the pathology in his neck. Offered surgical referral but patient declined.    MRI of the cervical spine reveals right-sided facet arthrosis with moderate to severe right-sided foraminal narrowing at C2-C3.  There is also severe right foraminal narrowing at C5-C6 and mild to moderate bilateral foraminal narrowing at C6-C7.  He has agreed to undergo cervical epidural steroid injection to see if this will alleviate some of his pain.    Not currently taking any prescription or over-the-counter analgesic medications.  He has tried physical therapy and chiropractic care without relief.    I have personally reviewed and/or updated the patient's past medical history, past surgical history, family history, social history, current medications, allergies, and vital signs today.     Review of Systems   Respiratory:  Negative for shortness of breath.    Cardiovascular:  Negative for chest pain.   Gastrointestinal:  Negative for constipation, diarrhea, nausea and vomiting.   Musculoskeletal:  Positive for neck pain. Negative for arthralgias, gait problem, joint swelling and myalgias.   Skin:  Negative for rash.   Neurological:  Negative for dizziness, seizures and weakness.   All other systems reviewed and are negative.      Patient Active Problem List   Diagnosis    Family hx colonic polyps    Proctitis    Cigar smoker    Dyslipidemia    Overweight (BMI 25.0-29.9)    Neck pain    Facet arthropathy, cervical       Past Medical History:  "  Diagnosis Date    Colitis     Headache(784.0) 12/12/23    Inflammatory bowel disease     Proctitis 01/21/2020    Rectal bleed        Past Surgical History:   Procedure Laterality Date    COLONOSCOPY      KNEE SURGERY      [place] COLONOSCOPY FLX DX W/COLLJ SPEC WHEN PFRMD N/A 01/11/2019    Procedure: COLONOSCOPY;  Surgeon: Stanford James MD;  Location:  [facility];  Service: Colorectal       Family History   Problem Relation Age of Onset    Heart disease Mother     No Known Problems Father     Colon cancer Neg Hx        Social History     Occupational History    Not on file   Tobacco Use    Smoking status: Former     Current packs/day: 0.00     Types: Cigarettes, Cigars     Quit date: 1/1/2021     Years since quitting: 3.3    Smokeless tobacco: Former     Types: Snuff     Quit date: 1/1/1989    Tobacco comments:     cigars   Vaping Use    Vaping status: Never Used   Substance and Sexual Activity    Alcohol use: Yes     Alcohol/week: 3.0 standard drinks of alcohol     Types: 3 Cans of beer per week     Comment: weekends    Drug use: No    Sexual activity: Yes     Partners: Female       Current Outpatient Medications on File Prior to Visit   Medication Sig    celecoxib (CeleBREX) 100 mg capsule Take 1 capsule (100 mg total) by mouth daily (Patient not taking: Reported on 3/21/2024)    methocarbamol (ROBAXIN) 500 mg tablet Take 1 tablet (500 mg total) by mouth daily at bedtime as needed for muscle spasms (Patient not taking: Reported on 3/21/2024)    methylPREDNISolone 4 MG tablet therapy pack Use as directed on package (Patient not taking: Reported on 3/21/2024)     No current facility-administered medications on file prior to visit.       No Known Allergies    Physical Exam:    /84   Pulse 67   Ht 6' 1\" (1.854 m)   Wt 93 kg (205 lb)   SpO2 97%   BMI 27.05 kg/m²     Constitutional:normal, well developed, well nourished, alert, in no distress and non-toxic and no overt pain " behavior.  Eyes:anicteric  HEENT:grossly intact  Neck:supple, symmetric, trachea midline and no masses   Pulmonary:even and unlabored  Cardiovascular:No edema or pitting edema present  Skin:Normal without rashes or lesions and well hydrated  Psychiatric:Mood and affect appropriate  Neurologic:Cranial Nerves II-XII grossly intact.  Negative Elan sign bilaterally.  Negative Spurling's.  Gross sensation to light touch intact and equal.  Musculoskeletal:normal upper extremity strength bilaterally.  Pain with cervical spine extension and rotation.

## 2024-05-06 ENCOUNTER — OFFICE VISIT (OUTPATIENT)
Dept: FAMILY MEDICINE CLINIC | Facility: CLINIC | Age: 54
End: 2024-05-06
Payer: COMMERCIAL

## 2024-05-06 VITALS
HEIGHT: 73 IN | WEIGHT: 204 LBS | BODY MASS INDEX: 27.04 KG/M2 | SYSTOLIC BLOOD PRESSURE: 106 MMHG | DIASTOLIC BLOOD PRESSURE: 92 MMHG | HEART RATE: 75 BPM | OXYGEN SATURATION: 92 %

## 2024-05-06 DIAGNOSIS — E78.5 DYSLIPIDEMIA: Primary | ICD-10-CM

## 2024-05-06 DIAGNOSIS — Z12.5 SCREENING FOR PROSTATE CANCER: ICD-10-CM

## 2024-05-06 DIAGNOSIS — J06.9 ACUTE URI: ICD-10-CM

## 2024-05-06 PROCEDURE — 99214 OFFICE O/P EST MOD 30 MIN: CPT | Performed by: NURSE PRACTITIONER

## 2024-05-06 RX ORDER — FLUTICASONE PROPIONATE 50 MCG
1 SPRAY, SUSPENSION (ML) NASAL DAILY
Qty: 16 G | Refills: 0 | Status: SHIPPED | OUTPATIENT
Start: 2024-05-06

## 2024-05-06 RX ORDER — MONTELUKAST SODIUM 10 MG/1
10 TABLET ORAL
Qty: 14 TABLET | Refills: 0 | Status: SHIPPED | OUTPATIENT
Start: 2024-05-06 | End: 2024-05-20

## 2024-05-06 RX ORDER — AZITHROMYCIN 250 MG/1
TABLET, FILM COATED ORAL DAILY
Qty: 6 TABLET | Refills: 0 | Status: SHIPPED | OUTPATIENT
Start: 2024-05-06 | End: 2024-05-11

## 2024-05-06 NOTE — ASSESSMENT & PLAN NOTE
Patient is currently not taking any medications.   He is overdue for routine lipid check  Labs have been ordered

## 2024-05-06 NOTE — PROGRESS NOTES
Name: Jasbir Tsai      : 1970      MRN: [id number]  Encounter Provider: CHADD Martinez  Encounter Date: 2024   Encounter department: [facility] includes the [facility]    Assessment & Plan     1. Dyslipidemia  Assessment & Plan:  Patient is currently not taking any medications.   He is overdue for routine lipid check  Labs have been ordered     Orders:  -     Lipid Panel with Direct LDL reflex; Future  -     Comprehensive metabolic panel  -     TSH, 3rd generation with Free T4 reflex; Future    2. Screening for prostate cancer  Comments:  psa ordered  Orders:  -     PSA, Total Screen; Future    3. Acute URI  Comments:  zpack flonase singulair prescribed  Orders:  -     montelukast (SINGULAIR) 10 mg tablet; Take 1 tablet (10 mg total) by mouth daily at bedtime for 14 days  -     azithromycin (Zithromax) 250 mg tablet; Take 2 tablets (500 mg total) by mouth daily for 1 day, THEN 1 tablet (250 mg total) daily for 4 days.  -     fluticasone (FLONASE) 50 mcg/act nasal spray; 1 spray into each nostril daily           Subjective      Patient has been sick for 10 days plus. He thought it was allergies when it started. Immediately started allegra and has richi taking this no change. Symptoms worsening. He took covid test it was also negative.   He is going away on vacation next week.   Has been taking nyquil for cough       Review of Systems   Constitutional:  Negative for appetite change, chills, diaphoresis and fatigue.   HENT:  Positive for congestion, ear pain (clogged), postnasal drip, rhinorrhea, sinus pressure, sinus pain and sore throat (slight). Negative for trouble swallowing.    Respiratory:  Positive for cough. Negative for chest tightness and shortness of breath.    Gastrointestinal:  Negative for abdominal pain, constipation, nausea and vomiting.   Musculoskeletal:  Positive for neck pain.   Allergic/Immunologic: Positive for environmental allergies.   Neurological:  Positive for headaches.  "Negative for dizziness and light-headedness.       Current Outpatient Medications on File Prior to Visit   Medication Sig   • celecoxib (CeleBREX) 100 mg capsule Take 1 capsule (100 mg total) by mouth daily (Patient not taking: Reported on 3/21/2024)   • methocarbamol (ROBAXIN) 500 mg tablet Take 1 tablet (500 mg total) by mouth daily at bedtime as needed for muscle spasms (Patient not taking: Reported on 3/21/2024)   • [DISCONTINUED] methylPREDNISolone 4 MG tablet therapy pack Use as directed on package (Patient not taking: Reported on 3/21/2024)       Objective     /92 (BP Location: Left arm, Patient Position: Sitting, Cuff Size: Standard)   Pulse 75   Ht 6' 1\" (1.854 m)   Wt 92.5 kg (204 lb)   SpO2 92%   BMI 26.91 kg/m²     Physical Exam  Vitals and nursing note reviewed.   Constitutional:       Appearance: Normal appearance. He is well-developed. He is ill-appearing (mild).   HENT:      Head: Normocephalic and atraumatic.      Right Ear: A middle ear effusion is present. Tympanic membrane is bulging.      Left Ear: Tympanic membrane normal.      Nose: Mucosal edema, congestion and rhinorrhea present. Rhinorrhea is clear.      Right Turbinates: Enlarged and swollen.      Right Sinus: No maxillary sinus tenderness.      Left Sinus: No maxillary sinus tenderness.      Mouth/Throat:      Lips: Pink.      Mouth: Mucous membranes are moist.   Eyes:      Extraocular Movements: Extraocular movements intact.      Conjunctiva/sclera: Conjunctivae normal.      Pupils: Pupils are equal.   Cardiovascular:      Rate and Rhythm: Normal rate and regular rhythm.      Heart sounds: S1 normal and S2 normal. No murmur heard.  Pulmonary:      Effort: Pulmonary effort is normal. No respiratory distress.      Breath sounds: Normal breath sounds.   Neurological:      Mental Status: He is alert and oriented to person, place, and time.   Psychiatric:         Mood and Affect: Mood normal.         Thought Content: Thought " content normal.       CHADD Martinez

## 2024-05-28 ENCOUNTER — HOSPITAL ENCOUNTER (OUTPATIENT)
Dept: RADIOLOGY | Facility: MEDICAL CENTER | Age: 54
Discharge: HOME | End: 2024-05-28
Payer: COMMERCIAL

## 2024-05-28 VITALS
DIASTOLIC BLOOD PRESSURE: 89 MMHG | SYSTOLIC BLOOD PRESSURE: 142 MMHG | OXYGEN SATURATION: 96 % | RESPIRATION RATE: 18 BRPM | TEMPERATURE: 98.4 F | HEART RATE: 76 BPM

## 2024-05-28 DIAGNOSIS — M54.12 CERVICAL RADICULITIS: ICD-10-CM

## 2024-05-28 PROCEDURE — 62321 NJX INTERLAMINAR CRV/THRC: CPT | Performed by: PHYSICAL MEDICINE & REHABILITATION

## 2024-05-28 RX ORDER — METHYLPREDNISOLONE ACETATE 80 MG/ML
80 INJECTION, SUSPENSION INTRA-ARTICULAR; INTRALESIONAL; INTRAMUSCULAR; PARENTERAL; SOFT TISSUE ONCE
Status: COMPLETED | OUTPATIENT
Start: 2024-05-28 | End: 2024-05-28

## 2024-05-28 RX ADMIN — METHYLPREDNISOLONE ACETATE 80 MG: 80 INJECTION, SUSPENSION INTRA-ARTICULAR; INTRALESIONAL; INTRAMUSCULAR; PARENTERAL; SOFT TISSUE at 16:14

## 2024-05-28 RX ADMIN — IOHEXOL 2 ML: 300 INJECTION, SOLUTION INTRAVENOUS at 16:12

## 2024-05-28 NOTE — DISCHARGE INSTR - LAB
Epidural Steroid Injection   WHAT YOU NEED TO KNOW:   An epidural steroid injection (BEAN) is a procedure to inject steroid medicine into the epidural space. The epidural space is between your spinal cord and vertebrae. Steroids reduce inflammation and fluid buildup in your spine that may be causing pain. You may be given pain medicine along with the steroids.          ACTIVITY  Do not drive or operate machinery today.  No strenuous activity today - bending, lifting, etc.  You may resume normal activites starting tomorrow - start slowly and as tolerated.  You may shower today, but no tub baths or hot tubs.  You may have numbness for several hours from the local anesthetic. Please use caution and common sense, especially with weight-bearing activities.    CARE OF THE INJECTION SITE  If you have soreness or pain, apply ice to the area today (20 minutes on/20 minutes off).  Starting tomorrow, you may use warm, moist heat or ice if needed.  You may have an increase or change in your discomfort for 36-48 hours after your treatment.  Apply ice and continue with any pain medication you have been prescribed.  Notify the [facility] if you have any of the following: redness, drainage, swelling, headache, stiff neck or fever above 100°F.    SPECIAL INSTRUCTIONS  Our office will contact you in approximately 7 days for a progress report.    MEDICATIONS  Continue to take all routine medications.  Our office may have instructed you to hold some medications.    As no general anesthesia was used in today's procedure, you should not experience any side effects related to anesthesia.     If you are diabetic, the steroids used in today's injection may temporarily increase your blood sugar levels after the first few days after your injection. Please keep a close eye on your sugars and alert the doctor who manages your diabetes if your sugars are significantly high from your baseline or you are symptomatic.     If you have a  problem specifically related to your procedure, please call our office at [phone].  Problems not related to your procedure should be directed to your primary care physician.

## 2024-05-28 NOTE — H&P
History of Present Illness: The patient is a 54 y.o. male who presents with complaints of right neck pain    Past Medical History:   Diagnosis Date    Colitis     Headache(784.0) 12/12/23    Inflammatory bowel disease     Proctitis 01/21/2020    Rectal bleed        Past Surgical History:   Procedure Laterality Date    COLONOSCOPY      KNEE SURGERY      [place] COLONOSCOPY FLX DX W/COLLJ SPEC WHEN PFRMD N/A 01/11/2019    Procedure: COLONOSCOPY;  Surgeon: Stanford James MD;  Location: [facility];  Service: Colorectal         Current Outpatient Medications:     celecoxib (CeleBREX) 100 mg capsule, Take 1 capsule (100 mg total) by mouth daily (Patient not taking: Reported on 3/21/2024), Disp: 30 capsule, Rfl: 1    fluticasone (FLONASE) 50 mcg/act nasal spray, 1 spray into each nostril daily (Patient not taking: Reported on 5/28/2024), Disp: 16 g, Rfl: 0    methocarbamol (ROBAXIN) 500 mg tablet, Take 1 tablet (500 mg total) by mouth daily at bedtime as needed for muscle spasms (Patient not taking: Reported on 3/21/2024), Disp: 30 tablet, Rfl: 0    montelukast (SINGULAIR) 10 mg tablet, Take 1 tablet (10 mg total) by mouth daily at bedtime for 14 days, Disp: 14 tablet, Rfl: 0    No Known Allergies    Physical Exam:   Vitals:    05/28/24 1604   BP: 147/91   Pulse: 76   Resp: 18   Temp: 98.4 °F (36.9 °C)   SpO2: 95%     General: Awake, Alert, Oriented x 3, Mood and affect appropriate  Respiratory: Respirations even and unlabored  Cardiovascular: Peripheral pulses intact; no edema  Musculoskeletal Exam: right neck pain    ASA Score: 2    Patient/Chart Verification  Patient ID Verified: Verbal  ID Band Applied: No  Consents Confirmed: Procedural, To be obtained in the Pre-Procedure area  H&P( within 30 days) Verified: To be obtained in the Pre-Procedure area  Allergies Reviewed: Yes  Anticoag/NSAID held?: No (denies nsaids, states not taking celebrex)  Currently on antibiotics?: No    Assessment:   1. Cervical radiculitis         Plan: Right C7-T1 CARMEN

## 2024-06-04 ENCOUNTER — TELEPHONE (OUTPATIENT)
Dept: PAIN MEDICINE | Facility: CLINIC | Age: 54
End: 2024-06-04

## 2024-06-04 ENCOUNTER — TELEPHONE (OUTPATIENT)
Age: 54
End: 2024-06-04

## 2024-06-04 NOTE — TELEPHONE ENCOUNTER
S/w Pt, Pt is S/p CARMEN on 05/28. Pt states he has had a runny nose since procedure, reports clear thin drainage from his nose, there is no color to drainage. Pt also states he feels congestion in his sinuses, and sounds nasally. RN advised would make JE aware.

## 2024-06-04 NOTE — TELEPHONE ENCOUNTER
Caller: baljinder Galvin  Doctor/office: Lito ARMANDO#: [phone]    % of improvement: 0    Pain Scale (1-10): 8 depends on what pt is doing an 8 or 9. It pt sit still no pain.

## 2024-06-04 NOTE — TELEPHONE ENCOUNTER
Caller: baljinder Galvin    Doctor: Lito    Reason for call: pt received injection pt stated ever since he received the injection he has been having a runny nose. Pt is unsure if this is a side effect of the injection. Please Advise     Call back#: [phone]

## 2024-06-25 ENCOUNTER — OFFICE VISIT (OUTPATIENT)
Dept: PAIN MEDICINE | Facility: MEDICAL CENTER | Age: 54
End: 2024-06-25
Payer: COMMERCIAL

## 2024-06-25 VITALS
BODY MASS INDEX: 27.05 KG/M2 | OXYGEN SATURATION: 95 % | DIASTOLIC BLOOD PRESSURE: 86 MMHG | SYSTOLIC BLOOD PRESSURE: 124 MMHG | HEART RATE: 70 BPM | WEIGHT: 205 LBS

## 2024-06-25 DIAGNOSIS — M54.2 NECK PAIN: ICD-10-CM

## 2024-06-25 DIAGNOSIS — M47.812 FACET ARTHROPATHY, CERVICAL: ICD-10-CM

## 2024-06-25 DIAGNOSIS — M54.12 CERVICAL RADICULITIS: ICD-10-CM

## 2024-06-25 DIAGNOSIS — M47.812 CERVICAL SPONDYLOSIS: Primary | ICD-10-CM

## 2024-06-25 PROCEDURE — 99214 OFFICE O/P EST MOD 30 MIN: CPT

## 2024-06-25 RX ORDER — MELOXICAM 15 MG/1
15 TABLET ORAL DAILY
Qty: 30 TABLET | Refills: 1 | Status: SHIPPED | OUTPATIENT
Start: 2024-06-25

## 2024-06-25 NOTE — PROGRESS NOTES
Assessment:  1. Cervical spondylosis    2. Cervical radiculitis    3. Neck pain    4. Facet arthropathy, cervical      Plan:  Unfortunately, the patient is reporting little/no relief of his pain since CARMEN. I do not feel it is reasonable to undergo any additional injection therapy at this time as it has not been affective for him.   Recommended consultation with neurosurgery, however patient is not interested at this time and states he would rather live with the pain.  Will trial meloxicam 15 mg once daily for pain relief.  Patient aware he may not take any other NSAIDs while using this medication.  He will call the office if he experiences any adverse effects after starting this medication.  Follow-up in 8 weeks to review response to meloxicam.    My impressions and treatment recommendations were discussed in detail with the patient who verbalized understanding and had no further questions.  Discharge instructions were provided. I personally saw and examined the patient and I agree with the above discussed plan of care.    No orders of the defined types were placed in this encounter.    New Medications Ordered This Visit   Medications    meloxicam (MOBIC) 15 mg tablet     Sig: Take 1 tablet (15 mg total) by mouth daily     Dispense:  30 tablet     Refill:  1       History of Present Illness:  Jasbir Tsai is a 54 y.o. male who presents for a follow up office visit in regards to ongoing right-sided neck pain in the upper cervical spine.  Patient reports that his pain is constant, and he is currently rating it a 7-8/10 in intensity.  He describes this as a dull/aching and throbbing sensation.  Patient reports his pain is localized to the upper right side of the neck and does not radiate into the upper extremities.  Denies any upper extremity weakness or numbness associated with this pain complaint.    Patient has failed to respond to recent right C7-T1 CARMEN.  Symptoms are most consistent with cervical facet  mediated pain, however he failed to respond to right C2-C3 MBB #1. MRI of the cervical spine reveals severe right-sided facet arthrosis with right-sided foraminal narrowing at C2-C3. There is severe right-sided foraminal narrowing at C5-C6 and moderate bilateral foraminal narrowing at C6-C7.  Patient not willing to seek out a surgical opinion at this time and states he would rather live with the pain.    Not currently taking any prescription or over-the-counter analgesic medications but is willing to try something at this point.  He has tried physical therapy and chiropractic care without relief.    I have personally reviewed and/or updated the patient's past medical history, past surgical history, family history, social history, current medications, allergies, and vital signs today.     Review of Systems   Constitutional:  Negative for chills and fever.   HENT:  Negative for hearing loss and sinus pain.    Eyes:  Negative for redness.   Respiratory:  Negative for wheezing.    Cardiovascular:  Negative for palpitations.   Gastrointestinal:  Negative for diarrhea and vomiting.   Endocrine: Negative for polyuria.   Genitourinary:  Negative for difficulty urinating.   Musculoskeletal:  Positive for neck pain. Negative for gait problem and myalgias.   Skin:  Negative for rash.   Neurological:  Negative for syncope and headaches.   Psychiatric/Behavioral:  Negative for dysphoric mood and sleep disturbance.        Patient Active Problem List   Diagnosis    Family hx colonic polyps    Proctitis    Cigar smoker    Dyslipidemia    Overweight (BMI 25.0-29.9)    Neck pain    Facet arthropathy, cervical    Cervical radiculitis       Past Medical History:   Diagnosis Date    Colitis     Headache(784.0) 12/12/23    Inflammatory bowel disease     Proctitis 01/21/2020    Rectal bleed        Past Surgical History:   Procedure Laterality Date    COLONOSCOPY      KNEE SURGERY      [place] COLONOSCOPY FLX DX W/COLLJ SPEC WHEN PFRMD N/A  01/11/2019    Procedure: COLONOSCOPY;  Surgeon: Stanford James MD;  Location: [facility];  Service: Colorectal       Family History   Problem Relation Age of Onset    Heart disease Mother     No Known Problems Father     Colon cancer Neg Hx        Social History     Occupational History    Not on file   Tobacco Use    Smoking status: Former     Current packs/day: 0.00     Types: Cigarettes, Cigars     Quit date: 1/1/2021     Years since quitting: 3.4    Smokeless tobacco: Former     Types: Snuff     Quit date: 1/1/1989    Tobacco comments:     cigars   Vaping Use    Vaping status: Never Used   Substance and Sexual Activity    Alcohol use: Yes     Alcohol/week: 3.0 standard drinks of alcohol     Types: 3 Cans of beer per week     Comment: weekends    Drug use: No    Sexual activity: Yes     Partners: Female       Current Outpatient Medications on File Prior to Visit   Medication Sig    fluticasone (FLONASE) 50 mcg/act nasal spray 1 spray into each nostril daily (Patient not taking: Reported on 5/28/2024)    methocarbamol (ROBAXIN) 500 mg tablet Take 1 tablet (500 mg total) by mouth daily at bedtime as needed for muscle spasms (Patient not taking: Reported on 3/21/2024)    montelukast (SINGULAIR) 10 mg tablet Take 1 tablet (10 mg total) by mouth daily at bedtime for 14 days    [DISCONTINUED] celecoxib (CeleBREX) 100 mg capsule Take 1 capsule (100 mg total) by mouth daily (Patient not taking: Reported on 3/21/2024)     No current facility-administered medications on file prior to visit.       No Known Allergies    Physical Exam:    /86   Pulse 70   Wt 93 kg (205 lb)   SpO2 95%   BMI 27.05 kg/m²     Constitutional:normal, well developed, well nourished, alert, in no distress and non-toxic and no overt pain behavior.  Eyes:anicteric  HEENT:grossly intact  Neck:supple, symmetric, trachea midline and no masses   Pulmonary:even and unlabored  Cardiovascular:No edema or pitting edema present  Skin:Normal without  rashes or lesions and well hydrated  Psychiatric:Mood and affect appropriate  Neurologic:Cranial Nerves II-XII grossly intact  Musculoskeletal:normal, except for pain with cervical spine extension and rotation to the right.  Tender to palpation over the upper cervical facet joints.  Negative Spurling's.  Negative Elan sign bilaterally.  Normal upper extremity strength bilaterally.

## 2024-08-05 ENCOUNTER — OFFICE VISIT (OUTPATIENT)
Dept: GASTROENTEROLOGY | Facility: CLINIC | Age: 54
End: 2024-08-05
Payer: COMMERCIAL

## 2024-08-05 VITALS
HEART RATE: 83 BPM | BODY MASS INDEX: 26.29 KG/M2 | TEMPERATURE: 98.4 F | WEIGHT: 198.4 LBS | SYSTOLIC BLOOD PRESSURE: 145 MMHG | OXYGEN SATURATION: 96 % | DIASTOLIC BLOOD PRESSURE: 93 MMHG | HEIGHT: 73 IN

## 2024-08-05 DIAGNOSIS — K62.5 RECTAL BLEEDING: Primary | ICD-10-CM

## 2024-08-05 DIAGNOSIS — K62.89 PROCTITIS: ICD-10-CM

## 2024-08-05 PROCEDURE — 99214 OFFICE O/P EST MOD 30 MIN: CPT | Performed by: DIETITIAN, REGISTERED

## 2024-08-05 RX ORDER — MESALAMINE 1.2 G/1
2400 TABLET, DELAYED RELEASE ORAL
Qty: 60 TABLET | Refills: 3 | Status: SHIPPED | OUTPATIENT
Start: 2024-08-05

## 2024-08-05 NOTE — H&P (VIEW-ONLY)
[facility] - Outpatient Consultation New Patient  Jasbir Tsai 54 y.o. male MRN: [id number]  Encounter: [id number]          ASSESSMENT AND PLAN:    1.  Rectal bleeding  2.  Proctitis  Patient reports chronic waxing/waning issues with rectal bleeding over the last 7+ years that seem to come and go without any known triggers.  In the past, he has been treated with enemas intermittently, which seems to resolve his rectal bleeding.  He has recently been experiencing 2 weeks of blood with almost every bowel movement, feeling more gassy than normal, and having more frequent bowel movements up to twice daily.  His stools are typically formed but sometimes a bit loose.  He denies any abdominal pain, rectal pain, tenesmus, unintentional weight loss.  He has a history of chronic neck pain/arthritis and also has a history of a chronic erythematous and pruritic rash on his right thigh since childhood.  Otherwise, he denies any other joint pains.  No known family history of CRC or IBD.  Colonoscopy in 2015 noted mild inflammation in the sigmoid/rectum.  Colonoscopy in 2019 with normal endoscopic appearance but chronic inflammation seen on sigmoid biopsy.  Colonoscopy in 2022 normal endoscopically and on pathology.    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  We discussed pursuing endoscopic evaluation with repeat colonoscopy versus conservative evaluation with blood/stool tests.  Patient would prefer to proceed with colonoscopy.  Will schedule colonoscopy with MiraLAX/Dulcolax prep.  -Also check CBC, CMP, CRP, and iron panel.  -Recommend starting oral mesalamine 2.4 g daily.  Discussed with patient that we would not recommend using this at the same time as Mobic.  Patient states he will hold his Mobic as his neck pain is not very bothersome.  -Advised patient to call the office or send a [employer] message with  any questions/concerns or any new/worsening symptoms.    I obtained informed consent from the patient. The risks/benefits/alternatives of the procedure were discussed with the patient. Risks included, but not limited to, infection, bleeding, perforation, injury to organs in the abdomen, missed lesion and incomplete procedure were discussed.     Follow up 3 months.    ________________________________________________________    HPI:  Jasbir Tsai is a 54 y.o. male with history of hyperlipidemia, cervical radiculitis, and proctitis who presents for evaluation of blood in stool.    Patient reports chronic waxing/waning issues with rectal bleeding over the last 7+ years that seem to come and go without any known triggers.  In the past, he has been treated with enemas intermittently, which seems to resolve his rectal bleeding.  He has recently been experiencing 2 weeks of blood with almost every bowel movement, feeling more gassy than normal, and having more frequent bowel movements up to twice daily.  His stools are typically formed but sometimes a bit loose.  He denies any abdominal pain, rectal pain, tenesmus, unintentional weight loss.  He has a history of chronic neck pain/arthritis and also has a history of a chronic erythematous and pruritic rash on his right thigh since childhood.  Otherwise, he denies any other joint pains.  No known family history of CRC or IBD.      Colonoscopy 8/2022 normal throughout the colon and in the terminal ileum.  Rectal biopsy benign/unremarkable.    Colonoscopy in 2019 with normal endoscopic appearance.  Terminal ileum biopsy with focal acute enteritis.  Sigmoid colon biopsy with increased chronic inflammation in the lamina propria, otherwise random colon biopsies were normal.    Colonoscopy in 2015 noted grossly normal terminal ileum with few distal erosions.  Also noted increasing mild inflammation in the distal sigmoid colon towards the rectum with moderate proctitis and  friable mucosa with bleeding points.      Answers submitted by the patient for this visit:  Abdominal Pain Questionnaire (Submitted on 7/31/2024)  Chief Complaint: Abdominal pain  anorexia: No  arthralgias: No  belching: No  constipation: No  diarrhea: No  dysuria: No  fever: No  flatus: Yes  frequency: No  headaches: No  hematochezia: Yes  hematuria: No  melena: No  myalgias: No  nausea: No  weight loss: No  vomiting: No        REVIEW OF SYSTEMS:  10 point ROS reviewed and negative, except as above    Historical Information   Past Medical History:   Diagnosis Date   • Colitis    • Headache(784.0) 12/12/23   • Inflammatory bowel disease    • Proctitis 01/21/2020   • Rectal bleed      Past Surgical History:   Procedure Laterality Date   • COLONOSCOPY     • KNEE SURGERY     • MT COLONOSCOPY FLX DX W/COLLJ SPEC WHEN PFRMD N/A 01/11/2019    Procedure: COLONOSCOPY;  Surgeon: Stanford James MD;  Location: [facility];  Service: Colorectal     Social History   Social History     Substance and Sexual Activity   Alcohol Use Yes   • Alcohol/week: 3.0 standard drinks of alcohol   • Types: 3 Cans of beer per week    Comment: weekends     Social History     Substance and Sexual Activity   Drug Use No     Social History     Tobacco Use   Smoking Status Former   • Current packs/day: 0.00   • Types: Cigarettes, Cigars   • Quit date: 1/1/2021   • Years since quitting: 3.5   Smokeless Tobacco Former   • Types: Snuff   • Quit date: 1/1/1989   Tobacco Comments    cigars     Family History   Problem Relation Age of Onset   • Heart disease Mother    • No Known Problems Father    • Colon cancer Neg Hx        Meds/Allergies       Current Outpatient Medications:   •  meloxicam (MOBIC) 15 mg tablet  •  mesalamine (LIALDA) 1.2 g EC tablet  •  fluticasone (FLONASE) 50 mcg/act nasal spray  •  methocarbamol (ROBAXIN) 500 mg tablet  •  montelukast (SINGULAIR) 10 mg tablet    No Known Allergies        Objective   Wt Readings from Last 3  Encounters:   08/05/24 90 kg (198 lb 6.4 oz)   06/25/24 93 kg (205 lb)   05/06/24 92.5 kg (204 lb)     Temp Readings from Last 3 Encounters:   08/05/24 98.4 °F (36.9 °C) (Tympanic)   05/28/24 98.4 °F (36.9 °C) (Temporal)   04/16/24 98.2 °F (36.8 °C) (Temporal)     BP Readings from Last 3 Encounters:   08/05/24 145/93   06/25/24 124/86   05/28/24 142/89     Pulse Readings from Last 3 Encounters:   08/05/24 83   06/25/24 70   05/28/24 76        PHYSICAL EXAM:     Physical Exam  Vitals reviewed.   Constitutional:       General: He is not in acute distress.     Appearance: He is well-developed.   HENT:      Head: Normocephalic and atraumatic.   Eyes:      Conjunctiva/sclera: Conjunctivae normal.   Cardiovascular:      Rate and Rhythm: Normal rate and regular rhythm.      Heart sounds: No murmur heard.  Pulmonary:      Effort: Pulmonary effort is normal. No respiratory distress.      Breath sounds: Normal breath sounds.   Abdominal:      General: Bowel sounds are normal. There is no distension.      Palpations: Abdomen is soft.      Tenderness: There is no abdominal tenderness.   Musculoskeletal:         General: No swelling.      Cervical back: Neck supple.   Skin:     General: Skin is warm and dry.   Neurological:      Mental Status: He is alert.   Psychiatric:         Mood and Affect: Mood normal.             Lab Results:   No visits with results within 1 Day(s) from this visit.   Latest known visit with results is:   Appointment on 01/18/2022   Component Date Value   • Hepatitis C Ab 01/18/2022 Non-reactive    • HIV-1/HIV-2 Ab 01/18/2022 Non-Reactive    • WBC 01/18/2022 6.05    • RBC 01/18/2022 4.92    • Hemoglobin 01/18/2022 14.8    • Hematocrit 01/18/2022 44.7    • MCV 01/18/2022 91    • MCH 01/18/2022 30.1    • MCHC 01/18/2022 33.1    • RDW 01/18/2022 13.2    • Platelets 01/18/2022 230    • MPV 01/18/2022 10.6    • Sodium 01/18/2022 140    • Potassium 01/18/2022 4.2    • Chloride 01/18/2022 108    • CO2 01/18/2022  27    • ANION GAP 01/18/2022 5    • BUN 01/18/2022 15    • Creatinine 01/18/2022 0.94    • Glucose 01/18/2022 75    • Calcium 01/18/2022 9.1    • AST 01/18/2022 17    • ALT 01/18/2022 45    • Alkaline Phosphatase 01/18/2022 62    • Total Protein 01/18/2022 7.8    • Albumin 01/18/2022 4.2    • Total Bilirubin 01/18/2022 0.59    • eGFR 01/18/2022 92    • Cholesterol 01/18/2022 207 (H)    • Triglycerides 01/18/2022 89    • HDL, Direct 01/18/2022 49    • LDL Calculated 01/18/2022 140 (H)    • TSH 3RD GENERATON 01/18/2022 1.150    • Color, UA 01/18/2022 Straw    • Clarity, UA 01/18/2022 Clear    • Specific Gravity, UA 01/18/2022 1.010    • pH, UA 01/18/2022 7.0    • Leukocytes, UA 01/18/2022 Negative    • Nitrite, UA 01/18/2022 Negative    • Protein, UA 01/18/2022 Negative    • Glucose, UA 01/18/2022 Negative    • Ketones, UA 01/18/2022 Negative    • Urobilinogen, UA 01/18/2022 0.2    • Bilirubin, UA 01/18/2022 Negative    • Occult Blood, UA 01/18/2022 Negative    • PSA 01/18/2022 0.7    • Vitamin B-12 01/18/2022 557    • Folate 01/18/2022 19.6 (H)    • Lyme total antibody 01/18/2022 17        Lab Results   Component Value Date    WBC 6.05 01/18/2022    HGB 14.8 01/18/2022    HCT 44.7 01/18/2022    MCV 91 01/18/2022     01/18/2022       Lab Results   Component Value Date    SODIUM 140 01/18/2022    K 4.2 01/18/2022     01/18/2022    CO2 27 01/18/2022    AGAP 5 01/18/2022    BUN 15 01/18/2022    CREATININE 0.94 01/18/2022    GLUC 75 01/18/2022    GLUF 88 08/25/2020    CALCIUM 9.1 01/18/2022    AST 17 01/18/2022    ALT 45 01/18/2022    ALKPHOS 62 01/18/2022    TP 7.8 01/18/2022    TBILI 0.59 01/18/2022    EGFR 92 01/18/2022         Radiology Results:   No results found.

## 2024-08-05 NOTE — PROGRESS NOTES
[facility] - Outpatient Consultation New Patient  Jasbir Tsai 54 y.o. male MRN: [id number]  Encounter: [id number]          ASSESSMENT AND PLAN:    1.  Rectal bleeding  2.  Proctitis  Patient reports chronic waxing/waning issues with rectal bleeding over the last 7+ years that seem to come and go without any known triggers.  In the past, he has been treated with enemas intermittently, which seems to resolve his rectal bleeding.  He has recently been experiencing 2 weeks of blood with almost every bowel movement, feeling more gassy than normal, and having more frequent bowel movements up to twice daily.  His stools are typically formed but sometimes a bit loose.  He denies any abdominal pain, rectal pain, tenesmus, unintentional weight loss.  He has a history of chronic neck pain/arthritis and also has a history of a chronic erythematous and pruritic rash on his right thigh since childhood.  Otherwise, he denies any other joint pains.  No known family history of CRC or IBD.  Colonoscopy in 2015 noted mild inflammation in the sigmoid/rectum.  Colonoscopy in 2019 with normal endoscopic appearance but chronic inflammation seen on sigmoid biopsy.  Colonoscopy in 2022 normal endoscopically and on pathology.    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  We discussed pursuing endoscopic evaluation with repeat colonoscopy versus conservative evaluation with blood/stool tests.  Patient would prefer to proceed with colonoscopy.  Will schedule colonoscopy with MiraLAX/Dulcolax prep.  -Also check CBC, CMP, CRP, and iron panel.  -Recommend starting oral mesalamine 2.4 g daily.  Discussed with patient that we would not recommend using this at the same time as Mobic.  Patient states he will hold his Mobic as his neck pain is not very bothersome.  -Advised patient to call the office or send a [employer] message with  any questions/concerns or any new/worsening symptoms.    I obtained informed consent from the patient. The risks/benefits/alternatives of the procedure were discussed with the patient. Risks included, but not limited to, infection, bleeding, perforation, injury to organs in the abdomen, missed lesion and incomplete procedure were discussed.     Follow up 3 months.    ________________________________________________________    HPI:  Jasbir Tsia is a 54 y.o. male with history of hyperlipidemia, cervical radiculitis, and proctitis who presents for evaluation of blood in stool.    Patient reports chronic waxing/waning issues with rectal bleeding over the last 7+ years that seem to come and go without any known triggers.  In the past, he has been treated with enemas intermittently, which seems to resolve his rectal bleeding.  He has recently been experiencing 2 weeks of blood with almost every bowel movement, feeling more gassy than normal, and having more frequent bowel movements up to twice daily.  His stools are typically formed but sometimes a bit loose.  He denies any abdominal pain, rectal pain, tenesmus, unintentional weight loss.  He has a history of chronic neck pain/arthritis and also has a history of a chronic erythematous and pruritic rash on his right thigh since childhood.  Otherwise, he denies any other joint pains.  No known family history of CRC or IBD.      Colonoscopy 8/2022 normal throughout the colon and in the terminal ileum.  Rectal biopsy benign/unremarkable.    Colonoscopy in 2019 with normal endoscopic appearance.  Terminal ileum biopsy with focal acute enteritis.  Sigmoid colon biopsy with increased chronic inflammation in the lamina propria, otherwise random colon biopsies were normal.    Colonoscopy in 2015 noted grossly normal terminal ileum with few distal erosions.  Also noted increasing mild inflammation in the distal sigmoid colon towards the rectum with moderate proctitis and  friable mucosa with bleeding points.      Answers submitted by the patient for this visit:  Abdominal Pain Questionnaire (Submitted on 7/31/2024)  Chief Complaint: Abdominal pain  anorexia: No  arthralgias: No  belching: No  constipation: No  diarrhea: No  dysuria: No  fever: No  flatus: Yes  frequency: No  headaches: No  hematochezia: Yes  hematuria: No  melena: No  myalgias: No  nausea: No  weight loss: No  vomiting: No        REVIEW OF SYSTEMS:  10 point ROS reviewed and negative, except as above    Historical Information   Past Medical History:   Diagnosis Date   • Colitis    • Headache(784.0) 12/12/23   • Inflammatory bowel disease    • Proctitis 01/21/2020   • Rectal bleed      Past Surgical History:   Procedure Laterality Date   • COLONOSCOPY     • KNEE SURGERY     • [place] COLONOSCOPY FLX DX W/COLLJ SPEC WHEN PFRMD N/A 01/11/2019    Procedure: COLONOSCOPY;  Surgeon: Stanford James MD;  Location: [facility];  Service: Colorectal     Social History   Social History     Substance and Sexual Activity   Alcohol Use Yes   • Alcohol/week: 3.0 standard drinks of alcohol   • Types: 3 Cans of beer per week    Comment: weekends     Social History     Substance and Sexual Activity   Drug Use No     Social History     Tobacco Use   Smoking Status Former   • Current packs/day: 0.00   • Types: Cigarettes, Cigars   • Quit date: 1/1/2021   • Years since quitting: 3.5   Smokeless Tobacco Former   • Types: Snuff   • Quit date: 1/1/1989   Tobacco Comments    cigars     Family History   Problem Relation Age of Onset   • Heart disease Mother    • No Known Problems Father    • Colon cancer Neg Hx        Meds/Allergies       Current Outpatient Medications:   •  meloxicam (MOBIC) 15 mg tablet  •  mesalamine (LIALDA) 1.2 g EC tablet  •  fluticasone (FLONASE) 50 mcg/act nasal spray  •  methocarbamol (ROBAXIN) 500 mg tablet  •  montelukast (SINGULAIR) 10 mg tablet    No Known Allergies        Objective   Wt Readings from Last 3  Encounters:   08/05/24 90 kg (198 lb 6.4 oz)   06/25/24 93 kg (205 lb)   05/06/24 92.5 kg (204 lb)     Temp Readings from Last 3 Encounters:   08/05/24 98.4 °F (36.9 °C) (Tympanic)   05/28/24 98.4 °F (36.9 °C) (Temporal)   04/16/24 98.2 °F (36.8 °C) (Temporal)     BP Readings from Last 3 Encounters:   08/05/24 145/93   06/25/24 124/86   05/28/24 142/89     Pulse Readings from Last 3 Encounters:   08/05/24 83   06/25/24 70   05/28/24 76        PHYSICAL EXAM:     Physical Exam  Vitals reviewed.   Constitutional:       General: He is not in acute distress.     Appearance: He is well-developed.   HENT:      Head: Normocephalic and atraumatic.   Eyes:      Conjunctiva/sclera: Conjunctivae normal.   Cardiovascular:      Rate and Rhythm: Normal rate and regular rhythm.      Heart sounds: No murmur heard.  Pulmonary:      Effort: Pulmonary effort is normal. No respiratory distress.      Breath sounds: Normal breath sounds.   Abdominal:      General: Bowel sounds are normal. There is no distension.      Palpations: Abdomen is soft.      Tenderness: There is no abdominal tenderness.   Musculoskeletal:         General: No swelling.      Cervical back: Neck supple.   Skin:     General: Skin is warm and dry.   Neurological:      Mental Status: He is alert.   Psychiatric:         Mood and Affect: Mood normal.             Lab Results:   No visits with results within 1 Day(s) from this visit.   Latest known visit with results is:   Appointment on 01/18/2022   Component Date Value   • Hepatitis C Ab 01/18/2022 Non-reactive    • HIV-1/HIV-2 Ab 01/18/2022 Non-Reactive    • WBC 01/18/2022 6.05    • RBC 01/18/2022 4.92    • Hemoglobin 01/18/2022 14.8    • Hematocrit 01/18/2022 44.7    • MCV 01/18/2022 91    • MCH 01/18/2022 30.1    • MCHC 01/18/2022 33.1    • RDW 01/18/2022 13.2    • Platelets 01/18/2022 230    • MPV 01/18/2022 10.6    • Sodium 01/18/2022 140    • Potassium 01/18/2022 4.2    • Chloride 01/18/2022 108    • CO2 01/18/2022  27    • ANION GAP 01/18/2022 5    • BUN 01/18/2022 15    • Creatinine 01/18/2022 0.94    • Glucose 01/18/2022 75    • Calcium 01/18/2022 9.1    • AST 01/18/2022 17    • ALT 01/18/2022 45    • Alkaline Phosphatase 01/18/2022 62    • Total Protein 01/18/2022 7.8    • Albumin 01/18/2022 4.2    • Total Bilirubin 01/18/2022 0.59    • eGFR 01/18/2022 92    • Cholesterol 01/18/2022 207 (H)    • Triglycerides 01/18/2022 89    • HDL, Direct 01/18/2022 49    • LDL Calculated 01/18/2022 140 (H)    • TSH 3RD GENERATON 01/18/2022 1.150    • Color, UA 01/18/2022 Straw    • Clarity, UA 01/18/2022 Clear    • Specific Gravity, UA 01/18/2022 1.010    • pH, UA 01/18/2022 7.0    • Leukocytes, UA 01/18/2022 Negative    • Nitrite, UA 01/18/2022 Negative    • Protein, UA 01/18/2022 Negative    • Glucose, UA 01/18/2022 Negative    • Ketones, UA 01/18/2022 Negative    • Urobilinogen, UA 01/18/2022 0.2    • Bilirubin, UA 01/18/2022 Negative    • Occult Blood, UA 01/18/2022 Negative    • PSA 01/18/2022 0.7    • Vitamin B-12 01/18/2022 557    • Folate 01/18/2022 19.6 (H)    • Lyme total antibody 01/18/2022 17        Lab Results   Component Value Date    WBC 6.05 01/18/2022    HGB 14.8 01/18/2022    HCT 44.7 01/18/2022    MCV 91 01/18/2022     01/18/2022       Lab Results   Component Value Date    SODIUM 140 01/18/2022    K 4.2 01/18/2022     01/18/2022    CO2 27 01/18/2022    AGAP 5 01/18/2022    BUN 15 01/18/2022    CREATININE 0.94 01/18/2022    GLUC 75 01/18/2022    GLUF 88 08/25/2020    CALCIUM 9.1 01/18/2022    AST 17 01/18/2022    ALT 45 01/18/2022    ALKPHOS 62 01/18/2022    TP 7.8 01/18/2022    TBILI 0.59 01/18/2022    EGFR 92 01/18/2022         Radiology Results:   No results found.

## 2024-08-06 ENCOUNTER — ANESTHESIA (OUTPATIENT)
Dept: ANESTHESIOLOGY | Facility: HOSPITAL | Age: 54
End: 2024-08-06

## 2024-08-06 ENCOUNTER — ANESTHESIA EVENT (OUTPATIENT)
Dept: ANESTHESIOLOGY | Facility: HOSPITAL | Age: 54
End: 2024-08-06

## 2024-08-12 ENCOUNTER — TELEPHONE (OUTPATIENT)
Dept: GASTROENTEROLOGY | Facility: MEDICAL CENTER | Age: 54
End: 2024-08-12

## 2024-08-12 NOTE — TELEPHONE ENCOUNTER
Confirming Upcoming Procedure: Colonoscopy on August 16  Physician performing: Dr. Meyer  Location of procedure:  [facility]  Prep: Miralax  Diabetic: N/A

## 2024-08-16 ENCOUNTER — ANESTHESIA EVENT (OUTPATIENT)
Dept: GASTROENTEROLOGY | Facility: MEDICAL CENTER | Age: 54
End: 2024-08-16

## 2024-08-16 ENCOUNTER — HOSPITAL ENCOUNTER (OUTPATIENT)
Dept: GASTROENTEROLOGY | Facility: MEDICAL CENTER | Age: 54
Setting detail: OUTPATIENT SURGERY
End: 2024-08-16
Payer: COMMERCIAL

## 2024-08-16 ENCOUNTER — ANESTHESIA (OUTPATIENT)
Dept: GASTROENTEROLOGY | Facility: MEDICAL CENTER | Age: 54
End: 2024-08-16

## 2024-08-16 VITALS
WEIGHT: 190 LBS | SYSTOLIC BLOOD PRESSURE: 126 MMHG | TEMPERATURE: 97.3 F | DIASTOLIC BLOOD PRESSURE: 71 MMHG | HEART RATE: 57 BPM | OXYGEN SATURATION: 95 % | HEIGHT: 73 IN | RESPIRATION RATE: 16 BRPM | BODY MASS INDEX: 25.18 KG/M2

## 2024-08-16 DIAGNOSIS — K51.311 ULCERATIVE PROCTOSIGMOIDITIS, WITH RECTAL BLEEDING (HCC): ICD-10-CM

## 2024-08-16 DIAGNOSIS — K62.5 RECTAL BLEEDING: ICD-10-CM

## 2024-08-16 DIAGNOSIS — K62.89 PROCTITIS: ICD-10-CM

## 2024-08-16 PROCEDURE — 88305 TISSUE EXAM BY PATHOLOGIST: CPT | Performed by: STUDENT IN AN ORGANIZED HEALTH CARE EDUCATION/TRAINING PROGRAM

## 2024-08-16 PROCEDURE — 88342 IMHCHEM/IMCYTCHM 1ST ANTB: CPT | Performed by: STUDENT IN AN ORGANIZED HEALTH CARE EDUCATION/TRAINING PROGRAM

## 2024-08-16 PROCEDURE — 45380 COLONOSCOPY AND BIOPSY: CPT | Performed by: STUDENT IN AN ORGANIZED HEALTH CARE EDUCATION/TRAINING PROGRAM

## 2024-08-16 RX ORDER — PROPOFOL 10 MG/ML
INJECTION, EMULSION INTRAVENOUS CONTINUOUS PRN
Status: DISCONTINUED | OUTPATIENT
Start: 2024-08-16 | End: 2024-08-16

## 2024-08-16 RX ORDER — LIDOCAINE HYDROCHLORIDE 20 MG/ML
INJECTION, SOLUTION EPIDURAL; INFILTRATION; INTRACAUDAL; PERINEURAL AS NEEDED
Status: DISCONTINUED | OUTPATIENT
Start: 2024-08-16 | End: 2024-08-16

## 2024-08-16 RX ORDER — MESALAMINE 4 G/60ML
4 SUSPENSION RECTAL
Qty: 28 ENEMA | Refills: 2 | Status: SHIPPED | OUTPATIENT
Start: 2024-08-16

## 2024-08-16 RX ORDER — SODIUM CHLORIDE 9 MG/ML
125 INJECTION, SOLUTION INTRAVENOUS CONTINUOUS
Status: DISCONTINUED | OUTPATIENT
Start: 2024-08-16 | End: 2024-08-20 | Stop reason: HOSPADM

## 2024-08-16 RX ORDER — PROPOFOL 10 MG/ML
INJECTION, EMULSION INTRAVENOUS AS NEEDED
Status: DISCONTINUED | OUTPATIENT
Start: 2024-08-16 | End: 2024-08-16

## 2024-08-16 RX ORDER — SODIUM CHLORIDE 9 MG/ML
INJECTION, SOLUTION INTRAVENOUS CONTINUOUS PRN
Status: DISCONTINUED | OUTPATIENT
Start: 2024-08-16 | End: 2024-08-16

## 2024-08-16 RX ADMIN — Medication 40 MG: at 12:10

## 2024-08-16 RX ADMIN — SODIUM CHLORIDE: 0.9 INJECTION, SOLUTION INTRAVENOUS at 12:02

## 2024-08-16 RX ADMIN — PROPOFOL 100 MG: 10 INJECTION, EMULSION INTRAVENOUS at 12:06

## 2024-08-16 RX ADMIN — PROPOFOL 100 MG: 10 INJECTION, EMULSION INTRAVENOUS at 12:05

## 2024-08-16 RX ADMIN — LIDOCAINE HYDROCHLORIDE 100 MG: 20 INJECTION, SOLUTION EPIDURAL; INFILTRATION; INTRACAUDAL; PERINEURAL at 12:05

## 2024-08-16 RX ADMIN — SODIUM CHLORIDE 125 ML/HR: 0.9 INJECTION, SOLUTION INTRAVENOUS at 11:52

## 2024-08-16 RX ADMIN — PROPOFOL 170 MCG/KG/MIN: 10 INJECTION, EMULSION INTRAVENOUS at 12:09

## 2024-08-16 NOTE — ANESTHESIA POSTPROCEDURE EVALUATION
Post-Op Assessment Note    CV Status:  Stable    Pain management: adequate       Mental Status:  Alert and awake   Hydration Status:  Euvolemic   PONV Controlled:  Controlled   Airway Patency:  Patent     Post Op Vitals Reviewed: Yes    No anethesia notable event occurred.    Staff: Anesthesiologist               /71 (08/16/24 1259)    Temp      Pulse 57 (08/16/24 1259)   Resp 16 (08/16/24 1259)    SpO2 95 % (08/16/24 1259)

## 2024-08-16 NOTE — ANESTHESIA PREPROCEDURE EVALUATION
Procedure:  COLONOSCOPY    Relevant Problems   PULMONARY   (+) Cigar smoker        Physical Exam    Airway    Mallampati score: II  TM Distance: >3 FB  Neck ROM: full     Dental   No notable dental hx     Cardiovascular      Pulmonary      Other Findings        Anesthesia Plan  ASA Score- 2     Anesthesia Type- IV sedation with anesthesia with ASA Monitors.         Additional Monitors:     Airway Plan:            Plan Factors-Exercise tolerance (METS): >4 METS.    Chart reviewed.    Patient summary reviewed.                  Induction- intravenous.    Postoperative Plan-     Perioperative Resuscitation Plan - Level 1 - Full Code.       Informed Consent- Anesthetic plan and risks discussed with patient.  I personally reviewed this patient with the CRNA. Discussed and agreed on the Anesthesia Plan with the CRNA..

## 2024-08-21 PROCEDURE — 88305 TISSUE EXAM BY PATHOLOGIST: CPT | Performed by: STUDENT IN AN ORGANIZED HEALTH CARE EDUCATION/TRAINING PROGRAM

## 2024-08-21 PROCEDURE — 88342 IMHCHEM/IMCYTCHM 1ST ANTB: CPT | Performed by: STUDENT IN AN ORGANIZED HEALTH CARE EDUCATION/TRAINING PROGRAM

## 2024-08-30 NOTE — RESULT ENCOUNTER NOTE
Patient updated by Anurag.    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 Recommended to continue Lialda and Rowasa enemas and follow-up with GI.

## 2024-09-19 ENCOUNTER — TELEPHONE (OUTPATIENT)
Age: 54
End: 2024-09-19

## 2024-09-19 NOTE — TELEPHONE ENCOUNTER
LVM, due to provider schedule change patient appointment needs to be reschedule. Left call back number patient needs to be schedule in a IBD slot and he is ibd patient

## 2024-11-21 ENCOUNTER — OFFICE VISIT (OUTPATIENT)
Age: 54
End: 2024-11-21
Payer: COMMERCIAL

## 2024-11-21 VITALS
WEIGHT: 206 LBS | DIASTOLIC BLOOD PRESSURE: 82 MMHG | BODY MASS INDEX: 27.3 KG/M2 | HEART RATE: 89 BPM | SYSTOLIC BLOOD PRESSURE: 128 MMHG | HEIGHT: 73 IN | TEMPERATURE: 97.8 F | OXYGEN SATURATION: 98 %

## 2024-11-21 DIAGNOSIS — K51.311 ULCERATIVE PROCTOSIGMOIDITIS, WITH RECTAL BLEEDING (HCC): Primary | ICD-10-CM

## 2024-11-21 PROCEDURE — 99214 OFFICE O/P EST MOD 30 MIN: CPT | Performed by: DIETITIAN, REGISTERED

## 2024-11-21 NOTE — ASSESSMENT & PLAN NOTE
Colonoscopy 8/16/2024 showed mild localized edematous mucosa with erosion in the terminal ileum, moderate generalized edematous and erythematous mucosa with erosion and loss of vascular pattern in the rectosigmoid and rectum consistent with ulcerative colitis, Calderon score 2.  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  Patient took Rowasa enemas for 1 month after his colonoscopy, with complete resolution of his rectal bleeding.  He continues to take mesalamine 2.4 g daily.  He is now having normal bowel function with no abdominal pain, diarrhea, rectal bleeding, loss of appetite, unexpected weight loss.  He is following a high-fiber diet and tolerating well.  He continues to have chronic neck pain.  He also has persistent eczema on his right thigh and right elbow.  He denies any issues with his eyes or frequent canker sores.  No known family history of IBD.     -Discussed with patient that his pattern of inflammation seen on colonoscopy is consistent with ulcerative colitis, which is a chronic condition that requires maintenance therapy to prevent worsening of symptoms and complications of inflammation.  -Continue mesalamine 2.4 g daily.  -If any recurrence of rectal bleeding, would recommend addition of Rowasa enemas.  -Repeat colonoscopy will be due in 2026.  -Continue with high-fiber diet.  -Advised patient to call the office or send a [employer] message if any recurrence of symptoms or any other questions/concerns.   normal...

## 2024-11-21 NOTE — PROGRESS NOTES
Name: Jasbir Tsai      : 1970      MRN: [id number]  Encounter Provider: Rene Bond PA-C  Encounter Date: 2024   Encounter department: [facility]  :  Assessment & Plan  Ulcerative proctosigmoiditis, with rectal bleeding (HCC)  Colonoscopy 2024 showed mild localized edematous mucosa with erosion in the terminal ileum, moderate generalized edematous and erythematous mucosa with erosion and loss of vascular pattern in the rectosigmoid and rectum consistent with ulcerative colitis, Calderon score 2.  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  Patient took Rowasa enemas for 1 month after his colonoscopy, with complete resolution of his rectal bleeding.  He continues to take mesalamine 2.4 g daily.  He is now having normal bowel function with no abdominal pain, diarrhea, rectal bleeding, loss of appetite, unexpected weight loss.  He is following a high-fiber diet and tolerating well.  He continues to have chronic neck pain.  He also has persistent eczema on his right thigh and right elbow.  He denies any issues with his eyes or frequent canker sores.  No known family history of IBD.     -Discussed with patient that his pattern of inflammation seen on colonoscopy is consistent with ulcerative colitis, which is a chronic condition that requires maintenance therapy to prevent worsening of symptoms and complications of inflammation.  -Continue mesalamine 2.4 g daily.  -If any recurrence of rectal bleeding, would recommend addition of Rowasa enemas.  -Repeat colonoscopy will be due in .  -Continue with high-fiber diet.  -Advised patient to call the office or send a S[employer]ethe[employer] message if any recurrence of symptoms or any other questions/concerns.    Follow up 6 months, unless needed sooner.    History of Present Illness     HPI  Jasbir Tsai is a 54 y.o. male who presents for follow-up.  Last office visit  "8/2024.    Colonoscopy 8/16/2024 showed mild localized edematous mucosa with erosion in the terminal ileum, moderate generalized edematous and erythematous mucosa with erosion and loss of vascular pattern in the rectosigmoid and rectum consistent with ulcerative colitis, Calderon score 2.  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    Patient took Rowasa enemas for 1 month after his colonoscopy, with complete resolution of his rectal bleeding.  He continues to take mesalamine 2.4 g daily.  He is now having normal bowel function with no abdominal pain, diarrhea, rectal bleeding, loss of appetite, unexpected weight loss.  He is following a high-fiber diet and tolerating well.  He continues to have chronic neck pain.  He also has persistent eczema on his right thigh and right elbow.  He denies any issues with his eyes or frequent canker sores.  No known family history of IBD.    Colonoscopy in 2015 noted mild inflammation in the sigmoid/rectum.   Colonoscopy in 2019 with normal endoscopic appearance but chronic inflammation seen on sigmoid biopsy.   Colonoscopy in 2022 normal endoscopically and on pathology.         Review of Systems   All other systems reviewed and are negative.         Objective   /82 (BP Location: Left arm, Patient Position: Sitting, Cuff Size: Standard)   Pulse 89   Temp 97.8 °F (36.6 °C) (Tympanic)   Ht 6' 1\" (1.854 m)   Wt 93.4 kg (206 lb)   SpO2 98%   BMI 27.18 kg/m²      Physical Exam  Vitals reviewed.   Constitutional:       General: He is not in acute distress.     Appearance: He is well-developed.   HENT:      Head: Normocephalic and atraumatic.   Eyes:      Conjunctiva/sclera: Conjunctivae normal.   Cardiovascular:      Rate and Rhythm: Normal rate and regular rhythm.      Heart sounds: No murmur heard.  Pulmonary:      Effort: Pulmonary effort is normal. No respiratory distress.      Breath sounds: Normal breath sounds. "   Abdominal:      General: Bowel sounds are normal. There is no distension.      Palpations: Abdomen is soft.      Tenderness: There is no abdominal tenderness.   Musculoskeletal:         General: No swelling.      Cervical back: Neck supple.   Skin:     General: Skin is warm and dry.   Neurological:      Mental Status: He is alert.   Psychiatric:         Mood and Affect: Mood normal.

## 2024-12-23 DIAGNOSIS — K62.89 PROCTITIS: ICD-10-CM

## 2024-12-23 DIAGNOSIS — K62.5 RECTAL BLEEDING: ICD-10-CM

## 2024-12-24 RX ORDER — MESALAMINE 1.2 G/1
2.4 TABLET, DELAYED RELEASE ORAL
Qty: 60 TABLET | Refills: 0 | Status: SHIPPED | OUTPATIENT
Start: 2024-12-24

## 2024-12-24 NOTE — TELEPHONE ENCOUNTER
Have contacted pt but left VM and have informed pt that we have approved script refill and there is lab orders that are still active on pt's chart that needs to completed, informed pt that If he goes out of the network to reach out and call the office for us to send forms. Have provided phone and department for pt to give the office a call back.

## 2024-12-26 NOTE — TELEPHONE ENCOUNTER
2nd attempt     Left a VM and have relayed that the refill has been approved and there is updated lab work that needs to be addressed, have informed pt if he goes outside network to give us a call to send over the order forms. Provided call back number with pt.

## 2025-01-21 ENCOUNTER — TELEPHONE (OUTPATIENT)
Age: 55
End: 2025-01-21

## 2025-01-21 NOTE — TELEPHONE ENCOUNTER
Pt called to inquire if he will need to fast for his appt tomorrow, pt is scheduled for annual physical. Reviewed chart to check if pt has any active lab orders to complete. Advised pt he has labs ordered by pcp back in May 2024 that do require fasting. If planning on getting blood work prior to appt he will need to fast but as for appt no.

## 2025-01-22 ENCOUNTER — OFFICE VISIT (OUTPATIENT)
Dept: FAMILY MEDICINE CLINIC | Facility: CLINIC | Age: 55
End: 2025-01-22
Payer: COMMERCIAL

## 2025-01-22 VITALS
TEMPERATURE: 98 F | DIASTOLIC BLOOD PRESSURE: 82 MMHG | HEART RATE: 82 BPM | HEIGHT: 73 IN | RESPIRATION RATE: 18 BRPM | SYSTOLIC BLOOD PRESSURE: 114 MMHG | WEIGHT: 207.4 LBS | BODY MASS INDEX: 27.49 KG/M2 | OXYGEN SATURATION: 97 %

## 2025-01-22 DIAGNOSIS — K51.311 ULCERATIVE PROCTOSIGMOIDITIS, WITH RECTAL BLEEDING (HCC): ICD-10-CM

## 2025-01-22 DIAGNOSIS — Z00.00 ANNUAL PHYSICAL EXAM: Primary | ICD-10-CM

## 2025-01-22 DIAGNOSIS — R94.120 FAILED HEARING SCREENING: ICD-10-CM

## 2025-01-22 PROCEDURE — 92551 PURE TONE HEARING TEST AIR: CPT | Performed by: NURSE PRACTITIONER

## 2025-01-22 PROCEDURE — 99396 PREV VISIT EST AGE 40-64: CPT | Performed by: NURSE PRACTITIONER

## 2025-01-22 NOTE — PROGRESS NOTES
Adult Annual Physical  Name: Jasbir Tsai      : 1970      MRN: [id number]  Encounter Provider: CHADD Martinez  Encounter Date: 2025   Encounter department: [facility]    Assessment & Plan  Ulcerative proctosigmoiditis, with rectal bleeding (HCC)  Patient continues to do well since being on medication. Follows with GI        Failed hearing screening  Recommend evaluation for hearing exam comprehensive   Orders:  •  Ambulatory Referral to Audiology; Future    Annual physical exam  Patient has open orders for routine labs inlcuding psa  Reminded to complete       Immunizations and preventive care screenings were discussed with patient today. Appropriate education was printed on patient's after visit summary.    Discussed risks and benefits of prostate cancer screening. We discussed the controversial history of PSA screening for prostate cancer in the [place] as well as the risk of over detection and over treatment of prostate cancer by way of PSA screening.  The patient understands that PSA blood testing is an imperfect way to screen for prostate cancer and that elevated PSA levels in the blood may also be caused by infection, inflammation, prostatic trauma or manipulation, urological procedures, or by benign prostatic enlargement.    The role of the digital rectal examination in prostate cancer screening was also discussed and I discussed with him that there is large interobserver variability in the findings of digital rectal examination.    Counseling:  Dental Health: discussed importance of regular tooth brushing, flossing, and dental visits.  Injury prevention: discussed safety/seat belts, safety helmets, smoke detectors, carbon monoxide detectors, and smoking near bedding or upholstery.  Sexual health: discussed sexually transmitted diseases, partner selection, use of condoms, avoidance of unintended pregnancy, and contraceptive alternatives.  Exercise: the  "importance of regular exercise/physical activity was discussed. Recommend exercise 3-5 times per week for at least 30 minutes.       Depression Screening and Follow-up Plan: Patient was screened for depression during today's encounter. They screened negative with a PHQ-2 score of 0.        History of Present Illness     Adult Annual Physical:  Patient presents for annual physical. Kamar is a 54 yo male pt here for annual physical.   Has concerns about his cholesterol, labs ordered. Reports small \"bump\" on L eye, tear duct. .     Diet and Physical Activity:  - Diet/Nutrition: well balanced diet.  - Exercise: strength training exercises, vigorous cardiovascular exercise, walking and 5-7 times a week on average.    Depression Screening:  - PHQ-2 Score: 0    General Health:  - Sleep: 7-8 hours of sleep on average.  - Hearing: decreased hearing bilateral ears and tinnitus.  - Vision: wears glasses and most recent eye exam > 1 year ago. readers  - Dental: regular dental visits and brushes teeth twice daily.     Health:  - History of STDs: no.   - Urinary symptoms: none.     Advanced Care Planning:  - Has an advanced directive?: no    - Has a durable medical POA?: no    - ACP document given to patient?: no      Review of Systems   Constitutional:  Negative for appetite change and fever.   HENT:  Positive for hearing loss (dificulty hearing).    Eyes:         L eye with small \"bump\"   Respiratory:  Negative for chest tightness and shortness of breath.    Cardiovascular:  Negative for chest pain, palpitations and leg swelling.   Gastrointestinal:  Negative for abdominal pain.   Genitourinary:  Negative for difficulty urinating.   Musculoskeletal:  Negative for arthralgias and myalgias.   Skin:  Negative for wound.   Neurological:  Negative for dizziness, light-headedness and headaches.   Psychiatric/Behavioral:  Negative for dysphoric mood and sleep disturbance. The patient is not nervous/anxious.          Objective   BP " "114/82 (BP Location: Left arm, Patient Position: Sitting, Cuff Size: Adult)   Pulse 82   Temp 98 °F (36.7 °C) (Temporal)   Resp 18   Ht 6' 1\" (1.854 m)   Wt 94.1 kg (207 lb 6.4 oz)   SpO2 97%   BMI 27.36 kg/m²     Physical Exam  Vitals reviewed.   Constitutional:       Appearance: Normal appearance.   HENT:      Head: Normocephalic.      Right Ear: Tympanic membrane and ear canal normal.      Left Ear: Tympanic membrane and ear canal normal.      Mouth/Throat:      Mouth: Mucous membranes are moist.      Pharynx: Oropharynx is clear.   Eyes:      General: Lids are normal.      Pupils: Pupils are equal, round, and reactive to light.        Comments: L eye with small fat deposit    Cardiovascular:      Rate and Rhythm: Normal rate and regular rhythm.      Heart sounds: Normal heart sounds.   Pulmonary:      Effort: Pulmonary effort is normal.      Breath sounds: Normal breath sounds.   Abdominal:      General: Abdomen is flat. Bowel sounds are normal.      Palpations: Abdomen is soft.   Musculoskeletal:         General: Normal range of motion.      Cervical back: Normal range of motion and neck supple.   Skin:     General: Skin is warm and dry.      Capillary Refill: Capillary refill takes less than 2 seconds.   Neurological:      Mental Status: He is alert and oriented to person, place, and time.   Psychiatric:         Mood and Affect: Mood normal.         "

## 2025-02-24 ENCOUNTER — RESULTS FOLLOW-UP (OUTPATIENT)
Age: 55
End: 2025-02-24

## 2025-02-24 ENCOUNTER — APPOINTMENT (OUTPATIENT)
Dept: LAB | Facility: CLINIC | Age: 55
End: 2025-02-24
Payer: COMMERCIAL

## 2025-02-24 DIAGNOSIS — K62.89 PROCTITIS: ICD-10-CM

## 2025-02-24 DIAGNOSIS — E78.5 DYSLIPIDEMIA: ICD-10-CM

## 2025-02-24 DIAGNOSIS — Z12.5 SCREENING FOR PROSTATE CANCER: ICD-10-CM

## 2025-02-24 DIAGNOSIS — K62.5 RECTAL BLEEDING: ICD-10-CM

## 2025-02-24 LAB
ALBUMIN SERPL BCG-MCNC: 4.7 G/DL (ref 3.5–5)
ALP SERPL-CCNC: 65 U/L (ref 34–104)
ALT SERPL W P-5'-P-CCNC: 25 U/L (ref 7–52)
ANION GAP SERPL CALCULATED.3IONS-SCNC: 8 MMOL/L (ref 4–13)
AST SERPL W P-5'-P-CCNC: 20 U/L (ref 13–39)
BASOPHILS # BLD AUTO: 0.01 THOUSANDS/ÂΜL (ref 0–0.1)
BASOPHILS NFR BLD AUTO: 0 % (ref 0–1)
BILIRUB SERPL-MCNC: 0.67 MG/DL (ref 0.2–1)
BUN SERPL-MCNC: 13 MG/DL (ref 5–25)
CALCIUM SERPL-MCNC: 9.6 MG/DL (ref 8.4–10.2)
CHLORIDE SERPL-SCNC: 103 MMOL/L (ref 96–108)
CHOLEST SERPL-MCNC: 236 MG/DL (ref ?–200)
CO2 SERPL-SCNC: 28 MMOL/L (ref 21–32)
CREAT SERPL-MCNC: 0.92 MG/DL (ref 0.6–1.3)
CRP SERPL QL: <1 MG/L
EOSINOPHIL # BLD AUTO: 0.03 THOUSAND/ÂΜL (ref 0–0.61)
EOSINOPHIL NFR BLD AUTO: 1 % (ref 0–6)
ERYTHROCYTE [DISTWIDTH] IN BLOOD BY AUTOMATED COUNT: 13.4 % (ref 11.6–15.1)
FERRITIN SERPL-MCNC: 103 NG/ML (ref 24–336)
GFR SERPL CREATININE-BSD FRML MDRD: 93 ML/MIN/1.73SQ M
GLUCOSE P FAST SERPL-MCNC: 105 MG/DL (ref 65–99)
HCT VFR BLD AUTO: 46.8 % (ref 36.5–49.3)
HDLC SERPL-MCNC: 61 MG/DL
HGB BLD-MCNC: 15.3 G/DL (ref 12–17)
IMM GRANULOCYTES # BLD AUTO: 0.01 THOUSAND/UL (ref 0–0.2)
IMM GRANULOCYTES NFR BLD AUTO: 0 % (ref 0–2)
IRON SATN MFR SERPL: 37 % (ref 15–50)
IRON SERPL-MCNC: 131 UG/DL (ref 50–212)
LDLC SERPL CALC-MCNC: 163 MG/DL (ref 0–100)
LYMPHOCYTES # BLD AUTO: 1.41 THOUSANDS/ÂΜL (ref 0.6–4.47)
LYMPHOCYTES NFR BLD AUTO: 32 % (ref 14–44)
MCH RBC QN AUTO: 29.9 PG (ref 26.8–34.3)
MCHC RBC AUTO-ENTMCNC: 32.7 G/DL (ref 31.4–37.4)
MCV RBC AUTO: 92 FL (ref 82–98)
MONOCYTES # BLD AUTO: 0.41 THOUSAND/ÂΜL (ref 0.17–1.22)
MONOCYTES NFR BLD AUTO: 9 % (ref 4–12)
NEUTROPHILS # BLD AUTO: 2.57 THOUSANDS/ÂΜL (ref 1.85–7.62)
NEUTS SEG NFR BLD AUTO: 58 % (ref 43–75)
NRBC BLD AUTO-RTO: 0 /100 WBCS
PLATELET # BLD AUTO: 272 THOUSANDS/UL (ref 149–390)
PMV BLD AUTO: 10.6 FL (ref 8.9–12.7)
POTASSIUM SERPL-SCNC: 4.2 MMOL/L (ref 3.5–5.3)
PROT SERPL-MCNC: 7.8 G/DL (ref 6.4–8.4)
PSA SERPL-MCNC: 0.93 NG/ML (ref 0–4)
RBC # BLD AUTO: 5.11 MILLION/UL (ref 3.88–5.62)
SODIUM SERPL-SCNC: 139 MMOL/L (ref 135–147)
TIBC SERPL-MCNC: 351.4 UG/DL (ref 250–450)
TRANSFERRIN SERPL-MCNC: 251 MG/DL (ref 203–362)
TRIGL SERPL-MCNC: 61 MG/DL (ref ?–150)
TSH SERPL DL<=0.05 MIU/L-ACNC: 1.05 UIU/ML (ref 0.45–4.5)
UIBC SERPL-MCNC: 220 UG/DL (ref 155–355)
WBC # BLD AUTO: 4.44 THOUSAND/UL (ref 4.31–10.16)

## 2025-02-24 PROCEDURE — 85025 COMPLETE CBC W/AUTO DIFF WBC: CPT

## 2025-02-24 PROCEDURE — 80061 LIPID PANEL: CPT

## 2025-02-24 PROCEDURE — 86140 C-REACTIVE PROTEIN: CPT

## 2025-02-24 PROCEDURE — 83550 IRON BINDING TEST: CPT

## 2025-02-24 PROCEDURE — 84443 ASSAY THYROID STIM HORMONE: CPT

## 2025-02-24 PROCEDURE — 82728 ASSAY OF FERRITIN: CPT

## 2025-02-24 PROCEDURE — 83540 ASSAY OF IRON: CPT

## 2025-02-24 PROCEDURE — 36415 COLL VENOUS BLD VENIPUNCTURE: CPT

## 2025-02-24 PROCEDURE — G0103 PSA SCREENING: HCPCS

## 2025-02-25 ENCOUNTER — RESULTS FOLLOW-UP (OUTPATIENT)
Dept: FAMILY MEDICINE CLINIC | Facility: CLINIC | Age: 55
End: 2025-02-25

## 2025-03-12 DIAGNOSIS — K62.89 PROCTITIS: ICD-10-CM

## 2025-03-12 DIAGNOSIS — K62.5 RECTAL BLEEDING: ICD-10-CM

## 2025-03-12 RX ORDER — MESALAMINE 1.2 G/1
2.4 TABLET, DELAYED RELEASE ORAL
Qty: 60 TABLET | Refills: 5 | Status: SHIPPED | OUTPATIENT
Start: 2025-03-12

## 2025-03-12 NOTE — TELEPHONE ENCOUNTER
Reason for call:   [x] Refill   [] Prior Auth  [] Other:     Office:   [] PCP/Provider -   [x] Specialty/Provider -   Ordering Department:  [facility]  Authorized By: Rene Bond PA-C    Medication:  mesalamine (LIALDA) 1.2 g EC tablet    Dose/Frequency: take 2 tablets by mouth daily with breakfast     Quantity: 60    Pharmacy: [id number] - [place] - [address] [phone]    Local Pharmacy   Does the patient have enough for 3 days?   [x] Yes   [] No - Send as HP to POD    Mail Away Pharmacy   Does the patient have enough for 10 days?   [] Yes   [] No - Send as HP to POD

## 2025-04-23 ENCOUNTER — TELEPHONE (OUTPATIENT)
Age: 55
End: 2025-04-23

## 2025-04-23 NOTE — TELEPHONE ENCOUNTER
Patient called today stating that he called the office earlier in regards to a bill that he received for 500 dollars. Patient stated that someone told him to call billing and when  he called billing they told him that the codes were put in as diagnostic and that in order to waive the bill, the codes have to be changed as preventative. I asked the patient if it was for labs or the office visit and patient did not have bill with him. Please review and advise [phone] thank you.

## 2025-04-24 NOTE — TELEPHONE ENCOUNTER
Spoke to patient, CMP, Lipid and TSH will be sent to insurance as corrected claim with preventative diagnosis code

## 2025-06-30 ENCOUNTER — OFFICE VISIT (OUTPATIENT)
Dept: FAMILY MEDICINE CLINIC | Facility: CLINIC | Age: 55
End: 2025-06-30
Payer: COMMERCIAL

## 2025-06-30 VITALS
WEIGHT: 205.6 LBS | OXYGEN SATURATION: 94 % | RESPIRATION RATE: 16 BRPM | SYSTOLIC BLOOD PRESSURE: 102 MMHG | TEMPERATURE: 97.6 F | HEART RATE: 79 BPM | HEIGHT: 73 IN | BODY MASS INDEX: 27.25 KG/M2 | DIASTOLIC BLOOD PRESSURE: 78 MMHG

## 2025-06-30 DIAGNOSIS — M72.2 PLANTAR FASCIITIS OF LEFT FOOT: Primary | ICD-10-CM

## 2025-06-30 PROCEDURE — 99213 OFFICE O/P EST LOW 20 MIN: CPT | Performed by: FAMILY MEDICINE

## 2025-07-02 ENCOUNTER — EVALUATION (OUTPATIENT)
Dept: PHYSICAL THERAPY | Facility: CLINIC | Age: 55
End: 2025-07-02
Attending: FAMILY MEDICINE
Payer: COMMERCIAL

## 2025-07-02 DIAGNOSIS — M72.2 PLANTAR FASCIITIS OF LEFT FOOT: Primary | ICD-10-CM

## 2025-07-02 PROCEDURE — 97110 THERAPEUTIC EXERCISES: CPT | Performed by: PHYSICAL THERAPIST

## 2025-07-02 PROCEDURE — 97161 PT EVAL LOW COMPLEX 20 MIN: CPT | Performed by: PHYSICAL THERAPIST

## 2025-07-02 NOTE — PROGRESS NOTES
PT Evaluation     Today's date: 2025  Patient name: Jasbir Tsai  : 1970  MRN: [id number]  Referring provider: Kristen Easton MD  Dx: No diagnosis found.               Assessment  Impairments: abnormal gait, abnormal muscle tone, abnormal or restricted ROM, abnormal movement, activity intolerance, impaired balance, impaired physical strength, lacks appropriate home exercise program, pain with function and weight-bearing intolerance    Assessment details: Jasbir Tsai is a 55 y.o. male presenting to physical therapy with left plantar fasciitis and presents with pain, decreased range of motion, decreased strength, gait/balance dysfunction, and decreased activity tolerance.  Assessment consistent with plantar fasciitis per AM/initial contact pain, pain location and limitations include posterior chain tightness and decreased midfoot stability.  Secondary to these impairments, patient has increased difficulty performing ADL's, household chores, and  work related tasks.  Jasbir would benefit from skilled PT to address these issues and maximize function.  Thank you for the referral.    Understanding of Dx/Px/POC: excellent     Prognosis: excellent    Goals  STG (4 weeks)  1. Patient will be independent with HEP  2. Decrease pain at worst by 2 points on NPRS  3. Patient will demonstrate ability to weight bear in the morning with 50% less pain  LTG (8 weeks)  1. Decrease pain at worst from 4 points on NPRS  2. Increase will demonstrate ability to weight bear in the morning without pain  3. Increase midfoot activation to decreased navicular drop in standing positions  4. Patient will demonstrate ability to resume running without pain  5. Increase FOTO score > or equal to expected outcome    Plan  Patient would benefit from: skilled PT    Planned therapy interventions: joint mobilization, manual therapy, neuromuscular re-education, patient education, strengthening, stretching, therapeutic exercise, home exercise  program, ADL training and balance    Frequency: 1x week  Duration in weeks: 8  Treatment plan discussed with: patient      Subjective Evaluation    History of Present Illness  Mechanism of injury: Patient reports to outpatient PT secondary to the onset of left plantar fasciitis.  Patient states that the pain began about 3 months prior with insidious onset.  Patient has attempted to perform self stretches and icing without relief thus far.  Patient describes the pain as achy and sharp which is worse with AM weight bearing and weight bearing upon initial standing and improved with rest.  Patient works as a  at [employer] [place] and notes difficulty with work duties, ADL's and leisure functions as a result.  Patients goals for PT are to decrease the pain and return to PLOF.    Quality of life: good    Patient Goals  Patient goals for therapy: increased strength, independence with ADLs/IADLs, return to sport/leisure activities, increased motion and decreased pain    Pain  Current pain ratin  At best pain ratin  At worst pain ratin  Quality: dull ache and sharp  Relieving factors: rest and relaxation  Aggravating factors: standing (position changes)    Social Support    Employment status: working  Hand dominance: right      Diagnostic Tests  No diagnostic tests performed  Treatments  No previous or current treatments      Objective     Tenderness   Left Ankle/Foot   Tenderness in the plantar fascia.     Additional Tenderness Details  (+) medial tubercle of the calcaneous     Active Range of Motion   Left Ankle/Foot   Dorsiflexion (ke): 12 degrees     Right Ankle/Foot   Dorsiflexion (ke): 7 degrees     Joint Play   Left Ankle/Foot  Hypomobile in the talocrural joint.     Strength/Myotome Testing     Left Ankle/Foot   Dorsiflexion: 4+  Plantar flexion: 4+  Inversion: 4+  Eversion: 4+    Right Ankle/Foot   Dorsiflexion: 4+  Plantar flexion: 4+  Inversion: 4+  Eversion: 4+    Tests   Left Ankle/Foot  "  Positive for navicular drop and windlass.     Right Ankle/Foot   Positive for navicular drop.              Precautions: Problem List[1]        Manuals 7/2            L plantar fascia release - manual  5'            Gastroc release w/ gun                                       Neuro Re-Ed             Midfoot - toe yoga in sitting             Toe yoga in standing             Sharp romberg balance on foam                                                                 Ther Ex             Recumbent bike warm up             Standing gastroc & soleus stretch at slant/step 3x20\" ea.            Plantar fascia towel stretch in AM 10 x10\"            Shoe insert education for arch support performed                                                                Ther Activity                                       Gait Training                                       Modalities                                                 [1]   Patient Active Problem List  Diagnosis    Family hx colonic polyps    Proctitis    Cigar smoker    Dyslipidemia    Overweight (BMI 25.0-29.9)    Neck pain    Facet arthropathy, cervical    Cervical radiculitis    Ulcerative proctosigmoiditis, with rectal bleeding (HCC)     "

## 2025-07-02 NOTE — HOME EXERCISE EDUCATION
Program_ID:[id number]   Access Code: [email or web address]  URL: [email or web address]  Date: 07-  Prepared By: Iftikhar Kothari    Program Notes      Exercises      - Long Sitting Plantar Fascia Stretch with Towel - 1 x daily - 7 x weekly -  sets - 10 reps - 10 hold      - Standing Soleus Stretch on Step - 3 x daily - 7 x weekly - 3 sets -  reps - 20 hold      - Standing Gastroc Stretch on Step - 3 x daily - 7 x weekly - 3 sets -  reps - 20 hold

## 2025-07-02 NOTE — HOME EXERCISE EDUCATION
Program_ID:[id number]   Access Code: [email or web address]  URL: [email or web address]  Date: 07-  Prepared By: Iftikhar Kothari    Program Notes      Exercises      - Long Sitting Plantar Fascia Stretch with Towel - 1 x daily - 7 x weekly -  sets - 10 reps - 10 hold      - Standing Soleus Stretch on Step - 3 x daily - 7 x weekly - 3 sets -  reps - 20 hold      - Standing Gastroc Stretch on Step - 3 x daily - 7 x weekly - 3 sets -  reps - 20 hold

## 2025-07-09 ENCOUNTER — OFFICE VISIT (OUTPATIENT)
Dept: PHYSICAL THERAPY | Facility: CLINIC | Age: 55
End: 2025-07-09
Attending: FAMILY MEDICINE
Payer: COMMERCIAL

## 2025-07-09 DIAGNOSIS — M72.2 PLANTAR FASCIITIS OF LEFT FOOT: Primary | ICD-10-CM

## 2025-07-09 PROCEDURE — 97140 MANUAL THERAPY 1/> REGIONS: CPT

## 2025-07-09 PROCEDURE — 97112 NEUROMUSCULAR REEDUCATION: CPT

## 2025-07-09 PROCEDURE — 97110 THERAPEUTIC EXERCISES: CPT

## 2025-07-09 NOTE — PROGRESS NOTES
"Daily Note     Today's date: 2025  Patient name: Jasbir Tsai  : 1970  MRN: [id number]  Referring provider: Kristen Easton MD  Dx:   Encounter Diagnosis     ICD-10-CM    1. Plantar fasciitis of left foot  M72.2         Start Time: 1030  Stop Time: 1110  Total time in clinic (min): 40 minutes    Subjective: Pt reports he has been completing his HEP, but feels the stretches are not helping. Pt reports feeling a little bit better due to his new inserts.       Objective: See treatment diary below      Assessment: Tolerated treatment well. Initiated strengthening program. Pt reported no tension of relief w/ manuals in joel;f. Pt had minor restriction in plantar fascia w/ palpation. Pt has decreased SL balance and decreased motor control w/ foot intrinsics. Patient demonstrated fatigue post treatment, exhibited good technique with therapeutic exercises, and would benefit from continued PT for increased mobility, increased strength, increased balance, and decreased symptom irritability.       Plan: Continue per plan of care.  Progress treatment as tolerated.       Precautions: Problem List[1]        Manuals            L plantar fascia release - manual  5' JMK           Gastroc release w/ gun  JMK           Mid foot and DF mob  JMK                        Neuro Re-Ed             Midfoot - toe yoga in sitting  BTB 2x30 ea           Toe yoga in standing             Sharp romberg balance on foam             SL stance on Foam  3x30\"           Supine Anterior Tib curl  2x30 10# KB           Foam tandem, side stepping  X10 laps ea                                     Ther Ex             Recumbent bike warm up  upright 6\"           Standing gastroc & soleus stretch at slant/step 3x20\" ea.            Plantar fascia towel stretch in AM 10 x10\"            Shoe insert education for arch support performed            Eccentric step downs (Fwd, lateral)  LLE 2x10 ea 6\"                                                  Ther " Activity                                       Gait Training                                       Modalities                                                    [1]   Patient Active Problem List  Diagnosis    Family hx colonic polyps    Proctitis    Cigar smoker    Dyslipidemia    Overweight (BMI 25.0-29.9)    Neck pain    Facet arthropathy, cervical    Cervical radiculitis    Ulcerative proctosigmoiditis, with rectal bleeding (HCC)

## 2025-07-18 ENCOUNTER — OFFICE VISIT (OUTPATIENT)
Dept: PHYSICAL THERAPY | Facility: CLINIC | Age: 55
End: 2025-07-18
Attending: FAMILY MEDICINE
Payer: COMMERCIAL

## 2025-07-18 DIAGNOSIS — M72.2 PLANTAR FASCIITIS OF LEFT FOOT: Primary | ICD-10-CM

## 2025-07-18 PROCEDURE — 97140 MANUAL THERAPY 1/> REGIONS: CPT | Performed by: PHYSICAL THERAPIST

## 2025-07-18 PROCEDURE — 97112 NEUROMUSCULAR REEDUCATION: CPT | Performed by: PHYSICAL THERAPIST

## 2025-07-18 PROCEDURE — 97110 THERAPEUTIC EXERCISES: CPT | Performed by: PHYSICAL THERAPIST

## 2025-07-18 NOTE — PROGRESS NOTES
"Daily Note     Today's date: 2025  Patient name: Jasbir Tsai  : 1970  MRN: [id number]  Referring provider: Kristen Easton MD  Dx:   Encounter Diagnosis     ICD-10-CM    1. Plantar fasciitis of left foot  M72.2                    Subjective: Patient reports HEP compliance.  States that he notices little difference thus far with pain that remains from prolonged periods of non weight bearing.        Objective: See treatment diary below      Assessment: Decreased irritability of plantar fascia insertion with palpation.   Educated on [place] sock for night team wearing per pain that remains worse in the AM and with periods of prolonged sitting followed by weight bearing.  Overall fair tolerance to treatment with excellent progression of midfoot stability.       Plan: Continue per plan of care.  Progress treatment as tolerated.       Precautions: Problem List[1]        Manuals           L plantar fascia release - manual + IASTM 5' JMK 5'/5'          Gastroc release w/ gun  JMK 5'          Mid foot and DF mob  JMK G4                       Neuro Re-Ed             Midfoot - toe yoga in sitting  BTB 2x30 ea 2x20          Toe yoga in standing   x20          Sharp romberg balance on foam   X20 alernating          SL stance on Foam  3x30\"           Supine Anterior Tib curl  2x30 10# KB           Foam tandem, side stepping  X10 laps ea           SLS rocker board DF - shoes on   3x20\"                       Ther Ex             Recumbent bike warm up  upright 6\" L4 5'          Standing gastroc & soleus stretch at slant/step 3x20\" ea.  3x20\" ea.          Plantar fascia towel stretch in AM 10 x10\"  10 x10\" towel          Shoe insert education for arch support performed            Eccentric step downs (Fwd, lateral)  LLE 2x10 ea 6\"           [place] sock   education                                    Ther Activity                                       Gait Training                                     "   Modalities                                                    [1]   Patient Active Problem List  Diagnosis    Family hx colonic polyps    Proctitis    Cigar smoker    Dyslipidemia    Overweight (BMI 25.0-29.9)    Neck pain    Facet arthropathy, cervical    Cervical radiculitis    Ulcerative proctosigmoiditis, with rectal bleeding (HCC)

## 2025-07-28 ENCOUNTER — OFFICE VISIT (OUTPATIENT)
Dept: PHYSICAL THERAPY | Facility: CLINIC | Age: 55
End: 2025-07-28
Attending: FAMILY MEDICINE
Payer: COMMERCIAL

## 2025-07-28 DIAGNOSIS — M72.2 PLANTAR FASCIITIS OF LEFT FOOT: Primary | ICD-10-CM

## 2025-07-28 PROCEDURE — 97110 THERAPEUTIC EXERCISES: CPT | Performed by: PHYSICAL THERAPIST

## 2025-07-28 PROCEDURE — 97140 MANUAL THERAPY 1/> REGIONS: CPT | Performed by: PHYSICAL THERAPIST

## 2025-07-28 PROCEDURE — 97112 NEUROMUSCULAR REEDUCATION: CPT | Performed by: PHYSICAL THERAPIST
